# Patient Record
Sex: MALE | Race: ASIAN | Employment: FULL TIME | URBAN - METROPOLITAN AREA
[De-identification: names, ages, dates, MRNs, and addresses within clinical notes are randomized per-mention and may not be internally consistent; named-entity substitution may affect disease eponyms.]

---

## 2019-06-12 ENCOUNTER — HOSPITAL ENCOUNTER (INPATIENT)
Facility: HOSPITAL | Age: 39
LOS: 2 days | Discharge: HOME | DRG: 247 | End: 2019-06-14
Attending: EMERGENCY MEDICINE | Admitting: HOSPITALIST

## 2019-06-12 DIAGNOSIS — I21.4 NSTEMI (NON-ST ELEVATED MYOCARDIAL INFARCTION) (HCC): Primary | ICD-10-CM

## 2019-06-12 PROBLEM — R07.9 ACUTE CHEST PAIN: Status: ACTIVE | Noted: 2019-06-12

## 2019-06-12 PROCEDURE — 99223 1ST HOSP IP/OBS HIGH 75: CPT | Performed by: HOSPITALIST

## 2019-06-12 RX ORDER — CHLORHEXIDINE GLUCONATE 4 G/100ML
30 SOLUTION TOPICAL
Status: COMPLETED | OUTPATIENT
Start: 2019-06-13 | End: 2019-06-13

## 2019-06-12 RX ORDER — ACETAMINOPHEN 500 MG
1000 TABLET ORAL ONCE
Status: COMPLETED | OUTPATIENT
Start: 2019-06-12 | End: 2019-06-12

## 2019-06-12 RX ORDER — HEPARIN SODIUM AND DEXTROSE 10000; 5 [USP'U]/100ML; G/100ML
INJECTION INTRAVENOUS CONTINUOUS
Status: DISCONTINUED | OUTPATIENT
Start: 2019-06-13 | End: 2019-06-13

## 2019-06-12 RX ORDER — NITROGLYCERIN 0.4 MG/1
0.4 TABLET SUBLINGUAL ONCE
Status: COMPLETED | OUTPATIENT
Start: 2019-06-12 | End: 2019-06-12

## 2019-06-12 RX ORDER — ASPIRIN 325 MG
325 TABLET ORAL DAILY
Status: DISCONTINUED | OUTPATIENT
Start: 2019-06-13 | End: 2019-06-13

## 2019-06-12 RX ORDER — ONDANSETRON 2 MG/ML
4 INJECTION INTRAMUSCULAR; INTRAVENOUS EVERY 6 HOURS PRN
Status: DISCONTINUED | OUTPATIENT
Start: 2019-06-12 | End: 2019-06-14

## 2019-06-12 RX ORDER — ASPIRIN 81 MG/1
324 TABLET, CHEWABLE ORAL DAILY
Status: DISCONTINUED | OUTPATIENT
Start: 2019-06-13 | End: 2019-06-13

## 2019-06-12 RX ORDER — ASPIRIN 81 MG/1
324 TABLET, CHEWABLE ORAL ONCE
Status: COMPLETED | OUTPATIENT
Start: 2019-06-12 | End: 2019-06-12

## 2019-06-12 RX ORDER — HEPARIN SODIUM AND DEXTROSE 10000; 5 [USP'U]/100ML; G/100ML
1000 INJECTION INTRAVENOUS ONCE
Status: CANCELLED | OUTPATIENT
Start: 2019-06-12 | End: 2019-06-12

## 2019-06-12 RX ORDER — ATORVASTATIN CALCIUM 40 MG/1
80 TABLET, FILM COATED ORAL NIGHTLY
Status: DISCONTINUED | OUTPATIENT
Start: 2019-06-12 | End: 2019-06-14

## 2019-06-12 RX ORDER — MORPHINE SULFATE 2 MG/ML
1 INJECTION, SOLUTION INTRAMUSCULAR; INTRAVENOUS EVERY 2 HOUR PRN
Status: DISCONTINUED | OUTPATIENT
Start: 2019-06-12 | End: 2019-06-14

## 2019-06-12 RX ORDER — CARVEDILOL 6.25 MG/1
6.25 TABLET ORAL 2 TIMES DAILY WITH MEALS
Status: ON HOLD | COMMUNITY
End: 2019-06-14

## 2019-06-12 RX ORDER — NITROGLYCERIN 0.4 MG/1
0.4 TABLET SUBLINGUAL EVERY 5 MIN PRN
Status: DISCONTINUED | OUTPATIENT
Start: 2019-06-12 | End: 2019-06-14

## 2019-06-12 RX ORDER — CLOPIDOGREL BISULFATE 75 MG/1
75 TABLET ORAL ONCE
Status: COMPLETED | OUTPATIENT
Start: 2019-06-12 | End: 2019-06-12

## 2019-06-12 RX ORDER — HEPARIN SODIUM AND DEXTROSE 10000; 5 [USP'U]/100ML; G/100ML
1000 INJECTION INTRAVENOUS ONCE
Status: COMPLETED | OUTPATIENT
Start: 2019-06-12 | End: 2019-06-13

## 2019-06-12 RX ORDER — HEPARIN SODIUM AND DEXTROSE 10000; 5 [USP'U]/100ML; G/100ML
1000 INJECTION INTRAVENOUS ONCE
Status: DISCONTINUED | OUTPATIENT
Start: 2019-06-13 | End: 2019-06-12

## 2019-06-12 RX ORDER — CLOPIDOGREL BISULFATE 75 MG/1
75 TABLET ORAL DAILY
Status: ON HOLD | COMMUNITY
End: 2019-06-14

## 2019-06-12 RX ORDER — NITROGLYCERIN 20 MG/100ML
INJECTION INTRAVENOUS
Status: DISCONTINUED | OUTPATIENT
Start: 2019-06-12 | End: 2019-06-14

## 2019-06-12 RX ORDER — ACETAMINOPHEN 325 MG/1
650 TABLET ORAL EVERY 6 HOURS PRN
Status: DISCONTINUED | OUTPATIENT
Start: 2019-06-12 | End: 2019-06-14

## 2019-06-12 RX ORDER — LISINOPRIL 10 MG/1
10 TABLET ORAL DAILY
Status: ON HOLD | COMMUNITY
End: 2019-06-14

## 2019-06-12 RX ORDER — MORPHINE SULFATE 4 MG/ML
4 INJECTION, SOLUTION INTRAMUSCULAR; INTRAVENOUS EVERY 2 HOUR PRN
Status: DISCONTINUED | OUTPATIENT
Start: 2019-06-12 | End: 2019-06-14

## 2019-06-12 RX ORDER — HEPARIN SODIUM 5000 [USP'U]/ML
5000 INJECTION, SOLUTION INTRAVENOUS; SUBCUTANEOUS EVERY 12 HOURS SCHEDULED
Status: DISCONTINUED | OUTPATIENT
Start: 2019-06-12 | End: 2019-06-12

## 2019-06-12 RX ORDER — HEPARIN SODIUM 1000 [USP'U]/ML
5000 INJECTION, SOLUTION INTRAVENOUS; SUBCUTANEOUS ONCE
Status: COMPLETED | OUTPATIENT
Start: 2019-06-12 | End: 2019-06-12

## 2019-06-12 RX ORDER — SODIUM CHLORIDE 0.9 % (FLUSH) 0.9 %
3 SYRINGE (ML) INJECTION AS NEEDED
Status: DISCONTINUED | OUTPATIENT
Start: 2019-06-12 | End: 2019-06-14

## 2019-06-12 RX ORDER — MORPHINE SULFATE 2 MG/ML
2 INJECTION, SOLUTION INTRAMUSCULAR; INTRAVENOUS EVERY 2 HOUR PRN
Status: DISCONTINUED | OUTPATIENT
Start: 2019-06-12 | End: 2019-06-14

## 2019-06-12 RX ORDER — HEPARIN SODIUM AND DEXTROSE 10000; 5 [USP'U]/100ML; G/100ML
INJECTION INTRAVENOUS CONTINUOUS
Status: CANCELLED | OUTPATIENT
Start: 2019-06-13

## 2019-06-12 NOTE — CONSULTS
[facility]    Cardiology Consultation    Boyd Kip Patient Status:  Emergency    3/11/1980 MRN [id number]   Location [address] Attending Veronique Claudio MD   Hosp Day # 0 PCP None Pcp     2019  Katharine Scanlon 5-20 mcg/min, Intravenous, Titrated  •  Heparin Sodium (Porcine) 1000 UNIT/ML injection 5,000 Units, 5,000 Units, Intravenous, Once  •  heparin (PORCINE) 40202imfnx/250mL infusion ED INITIAL DOSE, 1,000 Units/hr, Intravenous, Once    Review of Systems:  GE be secondary to pulmonary disease consider old anterior infarct. ABNORMAL No previous ECGs available Electronically signed on 06/12/2019 at 17:22 by Eduardo Lee       Impression:  Patient Active Problem List:     Acute chest pain     NSTEMI (non-ST elev

## 2019-06-12 NOTE — H&P
[facility]    PATIENT'S NAME: Cl Guevara   ATTENDING PHYSICIAN: Kadi Gauthier MD   PATIENT ACCOUNT#:   [id number]    LOCATION:  [place] [address]  MEDICAL RECORD #:   [id number]       YOB: 1980  ADMISSION DATE:       06/12/20 place, and person. Moderate distress. VITAL SIGNS:  Temperature 98.0, pulse 81, respiratory rate 15, blood pressure 144/89, pulse ox 100% on room air. HEENT:  Atraumatic. Oropharynx clear. Moist mucous membranes. Normal hard and soft palate.   Eyes:

## 2019-06-12 NOTE — ED NOTES
Received pt a/ox3, clear speech, nad, no resp distress, ambulatory with steady gait  Here with c/o L side non radiating CP since [id number] today. States he was driving and ran to get out of the ran prior to pain.   Pt reports hx of cardiac stents, has been

## 2019-06-12 NOTE — ED PROVIDER NOTES
Patient Seen in: [facility] AND [facility] Emergency Department    History   Patient presents with:  Chest Pain Angina (cardiovascular)    Stated Complaint: L sided chest pain    HPI    20-year-old male with history of CAD status post cardiac stent 4 years ago, rhythm, normal heart sounds and intact distal pulses. Pulmonary/Chest: Effort normal and breath sounds normal. No respiratory distress. Abdominal: Soft. Bowel sounds are normal. There is no tenderness. Musculoskeletal: Normal range of motion.    Neuro intervals and axes as noted on EKG Report.   Rate: 79  Rhythm: Sinus Rhythm  Reading: Sinus rhythm, no ST elevation or depression              Imaging Results Available and Reviewed while in ED: No orders to display    ED Medications Administered:   Tram Tang Once      IP Consult to Cardiology Once      MD Discussions or Sign-Outs: Discussed with Dr. Ar Velasquez, recommends giving 75 mg Plavix. Seen in the emergency department by Dr. Frost Leader, recommend starting cardiac dose heparin, no other recommendations.   Discu 6/12/2019 Unknown

## 2019-06-12 NOTE — ED NOTES
Nitro drip started and meds given, see mar  Pt continues to c/o CP and RESENDEZ  Will continue to monitor  Pt and family updated on plan of care for admit

## 2019-06-13 ENCOUNTER — APPOINTMENT (OUTPATIENT)
Dept: INTERVENTIONAL RADIOLOGY/VASCULAR | Facility: HOSPITAL | Age: 39
DRG: 247 | End: 2019-06-13
Attending: INTERNAL MEDICINE

## 2019-06-13 ENCOUNTER — APPOINTMENT (OUTPATIENT)
Dept: CV DIAGNOSTICS | Facility: HOSPITAL | Age: 39
DRG: 247 | End: 2019-06-13
Attending: INTERNAL MEDICINE

## 2019-06-13 PROCEDURE — B2151ZZ FLUOROSCOPY OF LEFT HEART USING LOW OSMOLAR CONTRAST: ICD-10-PCS | Performed by: INTERNAL MEDICINE

## 2019-06-13 PROCEDURE — 93306 TTE W/DOPPLER COMPLETE: CPT | Performed by: INTERNAL MEDICINE

## 2019-06-13 PROCEDURE — B2111ZZ FLUOROSCOPY OF MULTIPLE CORONARY ARTERIES USING LOW OSMOLAR CONTRAST: ICD-10-PCS | Performed by: INTERNAL MEDICINE

## 2019-06-13 PROCEDURE — 4A023N7 MEASUREMENT OF CARDIAC SAMPLING AND PRESSURE, LEFT HEART, PERCUTANEOUS APPROACH: ICD-10-PCS | Performed by: INTERNAL MEDICINE

## 2019-06-13 PROCEDURE — 99232 SBSQ HOSP IP/OBS MODERATE 35: CPT | Performed by: HOSPITALIST

## 2019-06-13 PROCEDURE — 027236Z DILATION OF CORONARY ARTERY, THREE ARTERIES WITH THREE DRUG-ELUTING INTRALUMINAL DEVICES, PERCUTANEOUS APPROACH: ICD-10-PCS | Performed by: INTERNAL MEDICINE

## 2019-06-13 RX ORDER — SPIRONOLACTONE 25 MG/1
25 TABLET ORAL DAILY
Status: DISCONTINUED | OUTPATIENT
Start: 2019-06-13 | End: 2019-06-14

## 2019-06-13 RX ORDER — HEPARIN SODIUM 1000 [USP'U]/ML
INJECTION, SOLUTION INTRAVENOUS; SUBCUTANEOUS
Status: COMPLETED
Start: 2019-06-13 | End: 2019-06-13

## 2019-06-13 RX ORDER — HYDRALAZINE HYDROCHLORIDE 20 MG/ML
10 INJECTION INTRAMUSCULAR; INTRAVENOUS EVERY 6 HOURS PRN
Status: DISCONTINUED | OUTPATIENT
Start: 2019-06-13 | End: 2019-06-14

## 2019-06-13 RX ORDER — MIDAZOLAM HYDROCHLORIDE 1 MG/ML
INJECTION INTRAMUSCULAR; INTRAVENOUS
Status: COMPLETED
Start: 2019-06-13 | End: 2019-06-13

## 2019-06-13 RX ORDER — ASPIRIN 81 MG/1
81 TABLET ORAL DAILY
Status: DISCONTINUED | OUTPATIENT
Start: 2019-06-13 | End: 2019-06-13

## 2019-06-13 RX ORDER — ASPIRIN 81 MG/1
81 TABLET ORAL DAILY
Status: DISCONTINUED | OUTPATIENT
Start: 2019-06-13 | End: 2019-06-14

## 2019-06-13 RX ORDER — METOPROLOL TARTRATE 50 MG/1
50 TABLET, FILM COATED ORAL
Status: DISCONTINUED | OUTPATIENT
Start: 2019-06-13 | End: 2019-06-14

## 2019-06-13 RX ORDER — SODIUM CHLORIDE 9 MG/ML
INJECTION, SOLUTION INTRAVENOUS CONTINUOUS
Status: ACTIVE | OUTPATIENT
Start: 2019-06-13 | End: 2019-06-13

## 2019-06-13 RX ORDER — SODIUM CHLORIDE 9 MG/ML
INJECTION, SOLUTION INTRAVENOUS
Status: COMPLETED
Start: 2019-06-13 | End: 2019-06-13

## 2019-06-13 RX ORDER — LIDOCAINE HYDROCHLORIDE 20 MG/ML
INJECTION, SOLUTION EPIDURAL; INFILTRATION; INTRACAUDAL; PERINEURAL
Status: COMPLETED
Start: 2019-06-13 | End: 2019-06-13

## 2019-06-13 RX ORDER — ENALAPRIL MALEATE 5 MG/1
5 TABLET ORAL 2 TIMES DAILY
Status: DISCONTINUED | OUTPATIENT
Start: 2019-06-13 | End: 2019-06-14

## 2019-06-13 NOTE — PLAN OF CARE
Problem: Patient/Family Goals  Goal: Patient/Family Long Term Goal  Description  Patient's Long Term Goal: go home    Interventions:  - Angiogram scheduled for tomorrow morning  - heparin and nitro drip infusing  - See additional Care Plan goals for spec patient/family  - Incorporate patient and family knowledge, values, beliefs, and cultural backgrounds into the planning and delivery of care  - Encourage patient/family to participate in care and decision-making at the level they choose  - Honor patient an

## 2019-06-13 NOTE — PROGRESS NOTES
[facility]    Progress Note    Amy Melchor Patient Status:  Inpatient    3/11/1980 MRN [id number]   Location [facility] 2W/SW Attending Eliceo Hagen MD   Hosp Day # 1 PCP None Pcp       Subjective:   Amy Melchor is a( Intravenous, Q6H PRN  •  morphINE sulfate (PF) 2 MG/ML injection 1 mg, 1 mg, Intravenous, Q2H PRN **OR** morphINE sulfate (PF) 2 MG/ML injection 2 mg, 2 mg, Intravenous, Q2H PRN **OR** morphINE sulfate (PF) 4 MG/ML injection 4 mg, 4 mg, Intravenous, Q2H [place]

## 2019-06-13 NOTE — PROCEDURES
[facility] - [facility]    [facility] Cardiac Cath Procedure Note  Rakan Cohn Patient Status:  Inpatient    3/11/1980 MRN [id number]   Location [facility] 2W/SW Attending Jessica Pathak MD   Hosp Day # 1 PCP None Pcp       Cardiologist: stent 3.0 x 18 mm RICARDO DAWIT @ 20 MADDY    Excellent angiographic result      Assessment:  NSTEMI secondary to thrombosed OM1  CAD s/p PCI as above  Reduced EF, ischemic    Recommendations:  Check echo  Complete cangrelor bag, on completion bolus ticagrelor

## 2019-06-14 VITALS
DIASTOLIC BLOOD PRESSURE: 74 MMHG | OXYGEN SATURATION: 98 % | HEIGHT: 66 IN | WEIGHT: 190.81 LBS | SYSTOLIC BLOOD PRESSURE: 111 MMHG | TEMPERATURE: 98 F | HEART RATE: 104 BPM | BODY MASS INDEX: 30.67 KG/M2 | RESPIRATION RATE: 18 BRPM

## 2019-06-14 PROCEDURE — 99239 HOSP IP/OBS DSCHRG MGMT >30: CPT | Performed by: HOSPITALIST

## 2019-06-14 RX ORDER — SPIRONOLACTONE 25 MG/1
25 TABLET ORAL DAILY
Qty: 30 TABLET | Refills: 0 | Status: SHIPPED | OUTPATIENT
Start: 2019-06-14 | End: 2019-06-14

## 2019-06-14 RX ORDER — POTASSIUM CHLORIDE 20 MEQ/1
40 TABLET, EXTENDED RELEASE ORAL ONCE
Status: COMPLETED | OUTPATIENT
Start: 2019-06-14 | End: 2019-06-14

## 2019-06-14 RX ORDER — CLOPIDOGREL BISULFATE 75 MG/1
600 TABLET ORAL ONCE
Status: COMPLETED | OUTPATIENT
Start: 2019-06-14 | End: 2019-06-14

## 2019-06-14 RX ORDER — ASPIRIN 81 MG/1
81 TABLET ORAL DAILY
Qty: 30 TABLET | Refills: 0 | Status: SHIPPED | OUTPATIENT
Start: 2019-06-14 | End: 2021-09-13

## 2019-06-14 RX ORDER — SPIRONOLACTONE 25 MG/1
25 TABLET ORAL DAILY
Qty: 30 TABLET | Refills: 2 | Status: SHIPPED | OUTPATIENT
Start: 2019-06-14 | End: 2019-10-28

## 2019-06-14 RX ORDER — ATORVASTATIN CALCIUM 80 MG/1
80 TABLET, FILM COATED ORAL NIGHTLY
Qty: 30 TABLET | Refills: 2 | Status: SHIPPED | OUTPATIENT
Start: 2019-06-14 | End: 2019-10-28

## 2019-06-14 RX ORDER — ENALAPRIL MALEATE 5 MG/1
5 TABLET ORAL 2 TIMES DAILY
Qty: 60 TABLET | Refills: 0 | Status: SHIPPED | OUTPATIENT
Start: 2019-06-14 | End: 2019-06-14

## 2019-06-14 RX ORDER — CLOPIDOGREL BISULFATE 75 MG/1
75 TABLET ORAL DAILY
Status: DISCONTINUED | OUTPATIENT
Start: 2019-06-15 | End: 2019-06-14

## 2019-06-14 RX ORDER — LISINOPRIL 5 MG/1
5 TABLET ORAL DAILY
Qty: 30 TABLET | Refills: 2 | Status: SHIPPED | OUTPATIENT
Start: 2019-06-14 | End: 2019-10-28

## 2019-06-14 RX ORDER — CLOPIDOGREL BISULFATE 75 MG/1
75 TABLET ORAL DAILY
Qty: 30 TABLET | Refills: 11 | Status: SHIPPED | OUTPATIENT
Start: 2019-06-14 | End: 2019-10-28

## 2019-06-14 RX ORDER — METOPROLOL TARTRATE 50 MG/1
50 TABLET, FILM COATED ORAL
Qty: 60 TABLET | Refills: 2 | Status: SHIPPED | OUTPATIENT
Start: 2019-06-14 | End: 2019-10-28

## 2019-06-14 RX ORDER — ATORVASTATIN CALCIUM 80 MG/1
80 TABLET, FILM COATED ORAL NIGHTLY
Qty: 30 TABLET | Refills: 0 | Status: SHIPPED | OUTPATIENT
Start: 2019-06-14 | End: 2019-06-14

## 2019-06-14 RX ORDER — METOPROLOL TARTRATE 50 MG/1
50 TABLET, FILM COATED ORAL
Qty: 60 TABLET | Refills: 0 | Status: SHIPPED | OUTPATIENT
Start: 2019-06-14 | End: 2019-06-14

## 2019-06-14 NOTE — DISCHARGE SUMMARY
[facility]    Discharge Summary    Tyler Sifuentes Patient Status:  Inpatient    3/11/1980 MRN [id number]   Location [facility] 3W/SW Attending Cody Abraham MD   Hosp Day # 2 PCP None Pcp     Date of Admission: 2019 D were unremarkable. His triglycerides were 1200. LDL was too lipemic to be measured. Troponin 0.058. White blood cell count 11.6. Patient was started on IV nitroglycerin. Based on Cardiology's recommendations, he was given aspirin and Plavix.   He will Quantity:  30 tablet  Refills:  2        CONTINUE taking these medications      Instructions Prescription details   Clopidogrel Bisulfate 75 MG Tabs  Commonly known as:  PLAVIX      Take 1 tablet (75 mg total) by mouth daily.    Quantity:  30 tablet  Refill

## 2019-06-14 NOTE — CM/SW NOTE
SW self-referred to meet w/ pt due to finance needs and being listed as uninsured. Pt is from [place] and stated he is uncertain if he will live in [place] or [place].  SW recommended pt to apply for medicaid online and then follow up w/ nearest public aide office i

## 2019-06-14 NOTE — CARDIAC REHAB
Cardiac Rehab Phase I    Activity:  Distance Per self  Assistance needed No  Patient tolerated activity Well per patient. Education:  Handouts provided and reviewed: [address] Diet: Healthy Cardiac diet reviewed.     Disease Pr

## 2019-06-14 NOTE — PLAN OF CARE
Otilia Washington is feeling well, anticipating discharge. Seen by dietitian and cardiac rehab. Follow up appointment made for wound check.     Problem: Patient/Family Goals  Goal: Patient/Family Long Term Goal  Description  Patient's Long Term Goal: go home    [facility] as my wife says that but I have never been diagnosed with it\"  - Provide timely, complete, and accurate information to patient/family  - Incorporate patient and family knowledge, values, beliefs, and cultural backgrounds into the planning and delivery of

## 2019-06-14 NOTE — PLAN OF CARE
Nanette Nevarez is being discharged with plan for follow up for cardiac rehab. Wound check appointment for post cath groin site declined per patient at this time. Patient agrees to call to schedule when he gets home.     Problem: Patient/Family Goals  Goal: Patient control medications as ordered  - Initiate emergency measures for life threatening arrhythmias  - Monitor electrolytes and administer replacement therapy as ordered  6/14/2019 1101 by Anthony Snider RN  Outcome: Adequate for Discharge  6/14/2019 1054 b

## 2019-06-14 NOTE — DIETARY NOTE
NUTRITION EDUCATION NOTE    Received consult for nutrition education. Appropriate education and handout provided. See education section of Epic for specifics.     Judie Rice RD,LDN  [phone]

## 2019-06-18 ENCOUNTER — CARDPULM VISIT (OUTPATIENT)
Dept: CARDIAC REHAB | Facility: HOSPITAL | Age: 39
End: 2019-06-18
Attending: INTERNAL MEDICINE

## 2019-10-28 ENCOUNTER — OFFICE VISIT (OUTPATIENT)
Dept: INTERNAL MEDICINE CLINIC | Facility: CLINIC | Age: 39
End: 2019-10-28
Payer: MEDICAID

## 2019-10-28 VITALS
WEIGHT: 193 LBS | OXYGEN SATURATION: 96 % | DIASTOLIC BLOOD PRESSURE: 78 MMHG | HEART RATE: 89 BPM | HEIGHT: 66 IN | BODY MASS INDEX: 31.02 KG/M2 | SYSTOLIC BLOOD PRESSURE: 119 MMHG

## 2019-10-28 DIAGNOSIS — Z12.5 SCREENING PSA (PROSTATE SPECIFIC ANTIGEN): ICD-10-CM

## 2019-10-28 DIAGNOSIS — E78.5 DYSLIPIDEMIA: ICD-10-CM

## 2019-10-28 DIAGNOSIS — I25.10 CORONARY ARTERY DISEASE INVOLVING NATIVE CORONARY ARTERY OF NATIVE HEART WITHOUT ANGINA PECTORIS: ICD-10-CM

## 2019-10-28 DIAGNOSIS — I10 ESSENTIAL HYPERTENSION: Primary | ICD-10-CM

## 2019-10-28 DIAGNOSIS — Z23 NEED FOR INFLUENZA VACCINATION: ICD-10-CM

## 2019-10-28 PROBLEM — R07.9 ACUTE CHEST PAIN: Status: RESOLVED | Noted: 2019-06-12 | Resolved: 2019-10-28

## 2019-10-28 PROCEDURE — 36415 COLL VENOUS BLD VENIPUNCTURE: CPT | Performed by: INTERNAL MEDICINE

## 2019-10-28 PROCEDURE — 90686 IIV4 VACC NO PRSV 0.5 ML IM: CPT | Performed by: INTERNAL MEDICINE

## 2019-10-28 PROCEDURE — 90471 IMMUNIZATION ADMIN: CPT | Performed by: INTERNAL MEDICINE

## 2019-10-28 PROCEDURE — 99214 OFFICE O/P EST MOD 30 MIN: CPT | Performed by: INTERNAL MEDICINE

## 2019-10-28 RX ORDER — SPIRONOLACTONE 25 MG/1
25 TABLET ORAL DAILY
Qty: 30 TABLET | Refills: 2 | Status: SHIPPED | OUTPATIENT
Start: 2019-10-28 | End: 2020-02-24

## 2019-10-28 RX ORDER — LISINOPRIL 5 MG/1
5 TABLET ORAL DAILY
Qty: 30 TABLET | Refills: 2 | Status: SHIPPED | OUTPATIENT
Start: 2019-10-28 | End: 2020-02-24

## 2019-10-28 RX ORDER — ATORVASTATIN CALCIUM 80 MG/1
80 TABLET, FILM COATED ORAL NIGHTLY
Qty: 30 TABLET | Refills: 2 | Status: SHIPPED | OUTPATIENT
Start: 2019-10-28 | End: 2020-02-24

## 2019-10-28 RX ORDER — CLOPIDOGREL BISULFATE 75 MG/1
75 TABLET ORAL DAILY
Qty: 30 TABLET | Refills: 11 | Status: SHIPPED | OUTPATIENT
Start: 2019-10-28 | End: 2021-01-09

## 2019-10-28 RX ORDER — METOPROLOL TARTRATE 50 MG/1
50 TABLET, FILM COATED ORAL
Qty: 60 TABLET | Refills: 2 | Status: SHIPPED | OUTPATIENT
Start: 2019-10-28 | End: 2020-02-24

## 2019-10-28 NOTE — PROGRESS NOTES
James Worthy is a 37year old male. Patient presents with:  [facility] Care    HPI:   43-year-old gentleman with a past medical history of coronary disease status post PCI, had thrombosis with repeat PCI, hypertension, dyslipidemia with a highly elevated t Disp: 30 tablet, Rfl: 0       Past Medical History:   Diagnosis Date   • Acute chest pain 6/12/2019   • Essential hypertension    • Heart disease    • Myocardial infarction [facility])       Past Surgical History:   Procedure Laterality Date   • OTHER SURGICAL H He is oriented to person, place, and time. He appears well-developed and well-nourished. HENT:   Head: Normocephalic. Eyes: Conjunctivae are normal.   Neck: Neck supple. No thyromegaly present.    Cardiovascular: Normal rate, regular rhythm and normal h today.  I encouraged patient to see his cardiologist.  - HEMOGLOBIN A1C; Future  - CARDIO - INTERNAL    4. Screening PSA (prostate specific antigen)    - PSA SCREEN; Future    5.  Need for influenza vaccination    - FLULAVAL INFLUENZA VACCINE QUAD PRESERVAT

## 2019-11-14 NOTE — PLAN OF CARE
Problem: Patient/Family Goals  Goal: Patient/Family Long Term Goal  Description  Patient's Long Term Goal: go home    Interventions:  - Angiogram scheduled for tomorrow morning  - heparin and nitro drip infusing  - See additional Care Plan goals for spec patient/family  - Incorporate patient and family knowledge, values, beliefs, and cultural backgrounds into the planning and delivery of care  - Encourage patient/family to participate in care and decision-making at the level they choose  - Honor patient an Statement Selected

## 2020-02-24 RX ORDER — LISINOPRIL 5 MG/1
TABLET ORAL
Qty: 30 TABLET | Refills: 1 | Status: SHIPPED | OUTPATIENT
Start: 2020-02-24 | End: 2020-05-15

## 2020-02-24 RX ORDER — ATORVASTATIN CALCIUM 80 MG/1
TABLET, FILM COATED ORAL
Qty: 30 TABLET | Refills: 1 | Status: SHIPPED | OUTPATIENT
Start: 2020-02-24 | End: 2020-05-15

## 2020-02-24 RX ORDER — METOPROLOL TARTRATE 50 MG/1
TABLET, FILM COATED ORAL
Qty: 60 TABLET | Refills: 1 | Status: SHIPPED | OUTPATIENT
Start: 2020-02-24 | End: 2020-05-15

## 2020-02-24 RX ORDER — SPIRONOLACTONE 25 MG/1
TABLET ORAL
Qty: 30 TABLET | Refills: 1 | Status: SHIPPED | OUTPATIENT
Start: 2020-02-24 | End: 2020-05-15

## 2020-02-24 NOTE — TELEPHONE ENCOUNTER
Please advise on refill requests. Patient was to have repeat A1c, CBC, lipid panel and follow up in office with you in 3 months.      No labs ordered

## 2020-05-15 RX ORDER — METOPROLOL TARTRATE 50 MG/1
TABLET, FILM COATED ORAL
Qty: 60 TABLET | Refills: 0 | Status: SHIPPED | OUTPATIENT
Start: 2020-05-15 | End: 2020-06-30

## 2020-05-15 RX ORDER — SPIRONOLACTONE 25 MG/1
TABLET ORAL
Qty: 30 TABLET | Refills: 0 | Status: SHIPPED | OUTPATIENT
Start: 2020-05-15 | End: 2020-06-30

## 2020-05-15 RX ORDER — LISINOPRIL 5 MG/1
TABLET ORAL
Qty: 30 TABLET | Refills: 0 | Status: SHIPPED | OUTPATIENT
Start: 2020-05-15 | End: 2020-06-30

## 2020-05-15 RX ORDER — ATORVASTATIN CALCIUM 80 MG/1
TABLET, FILM COATED ORAL
Qty: 30 TABLET | Refills: 0 | Status: SHIPPED | OUTPATIENT
Start: 2020-05-15 | End: 2020-06-30

## 2020-05-18 ENCOUNTER — VIRTUAL PHONE E/M (OUTPATIENT)
Dept: INTERNAL MEDICINE CLINIC | Facility: CLINIC | Age: 40
End: 2020-05-18
Payer: MEDICAID

## 2020-05-18 DIAGNOSIS — I10 ESSENTIAL HYPERTENSION: Primary | ICD-10-CM

## 2020-05-18 DIAGNOSIS — R73.03 PREDIABETES: ICD-10-CM

## 2020-05-18 PROCEDURE — 99213 OFFICE O/P EST LOW 20 MIN: CPT | Performed by: INTERNAL MEDICINE

## 2020-05-18 NOTE — PROGRESS NOTES
Swetha Boston is a 41year old male. No chief complaint on file. Virtual/Telephone Check-In    Swetha Boston verbally consents to a Virtual/Telephone Check-In service on 05/18/20.   Patient understands and accepts financial responsibility for any deductib REVIEW OF SYSTEMS:     Review of Systems   Constitutional: Negative for activity change, appetite change and fever. HENT: Negative for congestion and voice change. Respiratory: Negative for cough and shortness of breath.     Cardiovascular: Negativ

## 2020-06-30 RX ORDER — METOPROLOL TARTRATE 50 MG/1
TABLET, FILM COATED ORAL
Qty: 60 TABLET | Refills: 0 | Status: SHIPPED | OUTPATIENT
Start: 2020-06-30 | End: 2020-07-27

## 2020-06-30 RX ORDER — SPIRONOLACTONE 25 MG/1
TABLET ORAL
Qty: 30 TABLET | Refills: 0 | Status: SHIPPED | OUTPATIENT
Start: 2020-06-30 | End: 2020-07-27

## 2020-06-30 RX ORDER — LISINOPRIL 5 MG/1
TABLET ORAL
Qty: 30 TABLET | Refills: 0 | Status: SHIPPED | OUTPATIENT
Start: 2020-06-30 | End: 2020-07-27

## 2020-06-30 RX ORDER — ATORVASTATIN CALCIUM 80 MG/1
TABLET, FILM COATED ORAL
Qty: 30 TABLET | Refills: 0 | Status: SHIPPED | OUTPATIENT
Start: 2020-06-30 | End: 2020-07-27

## 2020-07-27 RX ORDER — ATORVASTATIN CALCIUM 80 MG/1
TABLET, FILM COATED ORAL
Qty: 30 TABLET | Refills: 0 | Status: SHIPPED
Start: 2020-07-27 | End: 2020-08-31

## 2020-07-27 RX ORDER — SPIRONOLACTONE 25 MG/1
TABLET ORAL
Qty: 30 TABLET | Refills: 0 | Status: SHIPPED | OUTPATIENT
Start: 2020-07-27 | End: 2020-08-31

## 2020-07-27 RX ORDER — LISINOPRIL 5 MG/1
TABLET ORAL
Qty: 30 TABLET | Refills: 0 | Status: SHIPPED | OUTPATIENT
Start: 2020-07-27 | End: 2020-07-30

## 2020-07-27 RX ORDER — METOPROLOL TARTRATE 50 MG/1
TABLET, FILM COATED ORAL
Qty: 60 TABLET | Refills: 0 | Status: SHIPPED | OUTPATIENT
Start: 2020-07-27 | End: 2020-08-31

## 2020-07-30 RX ORDER — LISINOPRIL 5 MG/1
TABLET ORAL
Qty: 30 TABLET | Refills: 0 | Status: SHIPPED | OUTPATIENT
Start: 2020-07-30 | End: 2020-08-31

## 2020-08-31 RX ORDER — ATORVASTATIN CALCIUM 80 MG/1
TABLET, FILM COATED ORAL
Qty: 30 TABLET | Refills: 0 | Status: SHIPPED | OUTPATIENT
Start: 2020-08-31 | End: 2020-09-27

## 2020-08-31 RX ORDER — LISINOPRIL 5 MG/1
TABLET ORAL
Qty: 30 TABLET | Refills: 0 | Status: SHIPPED | OUTPATIENT
Start: 2020-08-31 | End: 2020-09-27

## 2020-08-31 RX ORDER — METOPROLOL TARTRATE 50 MG/1
TABLET, FILM COATED ORAL
Qty: 60 TABLET | Refills: 0 | Status: SHIPPED | OUTPATIENT
Start: 2020-08-31 | End: 2020-09-27

## 2020-08-31 RX ORDER — SPIRONOLACTONE 25 MG/1
25 TABLET ORAL DAILY
Qty: 30 TABLET | Refills: 0 | Status: SHIPPED | OUTPATIENT
Start: 2020-08-31 | End: 2020-09-27

## 2020-09-27 RX ORDER — SPIRONOLACTONE 25 MG/1
TABLET ORAL
Qty: 30 TABLET | Refills: 0 | Status: SHIPPED | OUTPATIENT
Start: 2020-09-27 | End: 2020-11-02

## 2020-09-27 RX ORDER — METOPROLOL TARTRATE 50 MG/1
TABLET, FILM COATED ORAL
Qty: 60 TABLET | Refills: 0 | Status: SHIPPED | OUTPATIENT
Start: 2020-09-27 | End: 2020-11-02

## 2020-09-27 RX ORDER — LISINOPRIL 5 MG/1
TABLET ORAL
Qty: 30 TABLET | Refills: 0 | Status: SHIPPED | OUTPATIENT
Start: 2020-09-27 | End: 2020-11-02

## 2020-09-27 RX ORDER — ATORVASTATIN CALCIUM 80 MG/1
TABLET, FILM COATED ORAL
Qty: 30 TABLET | Refills: 0 | Status: SHIPPED | OUTPATIENT
Start: 2020-09-27 | End: 2020-11-02

## 2020-11-02 RX ORDER — METOPROLOL TARTRATE 50 MG/1
TABLET, FILM COATED ORAL
Qty: 60 TABLET | Refills: 0 | Status: SHIPPED | OUTPATIENT
Start: 2020-11-02 | End: 2021-01-09

## 2020-11-02 RX ORDER — SPIRONOLACTONE 25 MG/1
TABLET ORAL
Qty: 30 TABLET | Refills: 0 | Status: SHIPPED | OUTPATIENT
Start: 2020-11-02 | End: 2021-01-09

## 2020-11-02 RX ORDER — LISINOPRIL 5 MG/1
TABLET ORAL
Qty: 30 TABLET | Refills: 0 | Status: SHIPPED | OUTPATIENT
Start: 2020-11-02 | End: 2021-01-09

## 2020-11-02 RX ORDER — ATORVASTATIN CALCIUM 80 MG/1
TABLET, FILM COATED ORAL
Qty: 30 TABLET | Refills: 0 | Status: SHIPPED | OUTPATIENT
Start: 2020-11-02 | End: 2021-01-09

## 2020-11-03 NOTE — TELEPHONE ENCOUNTER
I have approved his medication. He is due for his physical and blood testing. Please inform patient and facilitate an appointment.   Thank you

## 2021-01-11 RX ORDER — LISINOPRIL 5 MG/1
5 TABLET ORAL DAILY
Qty: 30 TABLET | Refills: 0 | Status: SHIPPED | OUTPATIENT
Start: 2021-01-11 | End: 2021-03-08

## 2021-01-11 RX ORDER — SPIRONOLACTONE 25 MG/1
25 TABLET ORAL DAILY
Qty: 30 TABLET | Refills: 0 | Status: SHIPPED | OUTPATIENT
Start: 2021-01-11 | End: 2021-03-08

## 2021-01-11 RX ORDER — METOPROLOL TARTRATE 50 MG/1
50 TABLET, FILM COATED ORAL 2 TIMES DAILY
Qty: 60 TABLET | Refills: 0 | Status: SHIPPED | OUTPATIENT
Start: 2021-01-11 | End: 2021-03-08

## 2021-01-11 RX ORDER — ATORVASTATIN CALCIUM 80 MG/1
80 TABLET, FILM COATED ORAL NIGHTLY
Qty: 30 TABLET | Refills: 0 | Status: SHIPPED | OUTPATIENT
Start: 2021-01-11 | End: 2021-03-08

## 2021-01-11 RX ORDER — CLOPIDOGREL BISULFATE 75 MG/1
75 TABLET ORAL DAILY
Qty: 30 TABLET | Refills: 11 | Status: SHIPPED | OUTPATIENT
Start: 2021-01-11 | End: 2021-03-08

## 2021-02-10 RX ORDER — METOPROLOL TARTRATE 50 MG/1
TABLET, FILM COATED ORAL
Qty: 60 TABLET | Refills: 0 | OUTPATIENT
Start: 2021-02-10

## 2021-02-10 RX ORDER — SPIRONOLACTONE 25 MG/1
TABLET ORAL
Qty: 30 TABLET | Refills: 0 | OUTPATIENT
Start: 2021-02-10

## 2021-02-10 RX ORDER — LISINOPRIL 5 MG/1
TABLET ORAL
Qty: 30 TABLET | Refills: 0 | OUTPATIENT
Start: 2021-02-10

## 2021-02-10 RX ORDER — CLOPIDOGREL BISULFATE 75 MG/1
TABLET ORAL
Qty: 30 TABLET | Refills: 10 | OUTPATIENT
Start: 2021-02-10

## 2021-02-10 RX ORDER — ATORVASTATIN CALCIUM 80 MG/1
TABLET, FILM COATED ORAL
Qty: 30 TABLET | Refills: 0 | OUTPATIENT
Start: 2021-02-10

## 2021-02-10 NOTE — TELEPHONE ENCOUNTER
Last seen in office in 2019. He needs an appointment for physical and for blood works. It is not safe to take medications without monitoring blood works.     If he is following up with another MD, he needs to schedule appointment and need to notify his ph

## 2021-03-03 ENCOUNTER — LAB ENCOUNTER (OUTPATIENT)
Dept: LAB | Facility: HOSPITAL | Age: 41
End: 2021-03-03
Attending: INTERNAL MEDICINE
Payer: MEDICAID

## 2021-03-03 DIAGNOSIS — I10 ESSENTIAL HYPERTENSION: ICD-10-CM

## 2021-03-03 DIAGNOSIS — R73.03 PREDIABETES: ICD-10-CM

## 2021-03-03 LAB
CHOLEST SMN-MCNC: 215 MG/DL (ref ?–200)
DEPRECATED RDW RBC AUTO: 36.8 FL (ref 35.1–46.3)
ERYTHROCYTE [DISTWIDTH] IN BLOOD BY AUTOMATED COUNT: 11.5 % (ref 11–15)
EST. AVERAGE GLUCOSE BLD GHB EST-MCNC: 123 MG/DL (ref 68–126)
HBA1C MFR BLD HPLC: 5.9 % (ref ?–5.7)
HCT VFR BLD AUTO: 44.4 %
HDLC SERPL-MCNC: 48 MG/DL (ref 40–59)
HGB BLD-MCNC: 16.1 G/DL
LDLC SERPL CALC-MCNC: 96 MG/DL (ref ?–100)
MCH RBC QN AUTO: 31.9 PG (ref 26–34)
MCHC RBC AUTO-ENTMCNC: 36.3 G/DL (ref 31–37)
MCV RBC AUTO: 88.1 FL
NONHDLC SERPL-MCNC: 167 MG/DL (ref ?–130)
PATIENT FASTING Y/N/NP: YES
PLATELET # BLD AUTO: 217 10(3)UL (ref 150–450)
RBC # BLD AUTO: 5.04 X10(6)UL
TRIGL SERPL-MCNC: 353 MG/DL (ref 30–149)
VLDLC SERPL CALC-MCNC: 71 MG/DL (ref 0–30)
WBC # BLD AUTO: 5.8 X10(3) UL (ref 4–11)

## 2021-03-03 PROCEDURE — 83036 HEMOGLOBIN GLYCOSYLATED A1C: CPT

## 2021-03-03 PROCEDURE — 36415 COLL VENOUS BLD VENIPUNCTURE: CPT

## 2021-03-03 PROCEDURE — 85027 COMPLETE CBC AUTOMATED: CPT

## 2021-03-03 PROCEDURE — 80061 LIPID PANEL: CPT

## 2021-03-08 ENCOUNTER — OFFICE VISIT (OUTPATIENT)
Dept: INTERNAL MEDICINE CLINIC | Facility: CLINIC | Age: 41
End: 2021-03-08
Payer: MEDICAID

## 2021-03-08 VITALS
HEART RATE: 64 BPM | DIASTOLIC BLOOD PRESSURE: 78 MMHG | BODY MASS INDEX: 29.89 KG/M2 | HEIGHT: 66 IN | OXYGEN SATURATION: 98 % | SYSTOLIC BLOOD PRESSURE: 110 MMHG | WEIGHT: 186 LBS | RESPIRATION RATE: 14 BRPM

## 2021-03-08 DIAGNOSIS — I25.10 CORONARY ARTERY DISEASE INVOLVING NATIVE CORONARY ARTERY OF NATIVE HEART WITHOUT ANGINA PECTORIS: ICD-10-CM

## 2021-03-08 DIAGNOSIS — Z00.00 ADULT GENERAL MEDICAL EXAM: Primary | ICD-10-CM

## 2021-03-08 PROCEDURE — 99396 PREV VISIT EST AGE 40-64: CPT | Performed by: INTERNAL MEDICINE

## 2021-03-08 PROCEDURE — 3078F DIAST BP <80 MM HG: CPT | Performed by: INTERNAL MEDICINE

## 2021-03-08 PROCEDURE — 3008F BODY MASS INDEX DOCD: CPT | Performed by: INTERNAL MEDICINE

## 2021-03-08 PROCEDURE — 3074F SYST BP LT 130 MM HG: CPT | Performed by: INTERNAL MEDICINE

## 2021-03-08 RX ORDER — METOPROLOL TARTRATE 50 MG/1
50 TABLET, FILM COATED ORAL 2 TIMES DAILY
Qty: 180 TABLET | Refills: 1 | Status: SHIPPED | OUTPATIENT
Start: 2021-03-08 | End: 2021-09-03

## 2021-03-08 RX ORDER — LISINOPRIL 5 MG/1
5 TABLET ORAL DAILY
Qty: 90 TABLET | Refills: 1 | Status: SHIPPED | OUTPATIENT
Start: 2021-03-08 | End: 2021-09-03

## 2021-03-08 RX ORDER — CLOPIDOGREL BISULFATE 75 MG/1
75 TABLET ORAL DAILY
Qty: 90 TABLET | Refills: 1 | Status: SHIPPED | OUTPATIENT
Start: 2021-03-08 | End: 2021-09-03

## 2021-03-08 RX ORDER — SPIRONOLACTONE 25 MG/1
25 TABLET ORAL DAILY
Qty: 30 TABLET | Refills: 0 | Status: CANCELLED | OUTPATIENT
Start: 2021-03-08

## 2021-03-08 RX ORDER — ATORVASTATIN CALCIUM 80 MG/1
80 TABLET, FILM COATED ORAL NIGHTLY
Qty: 90 TABLET | Refills: 1 | Status: SHIPPED | OUTPATIENT
Start: 2021-03-08 | End: 2021-09-03

## 2021-03-08 RX ORDER — SPIRONOLACTONE 25 MG/1
25 TABLET ORAL DAILY
Qty: 90 TABLET | Refills: 1 | Status: SHIPPED | OUTPATIENT
Start: 2021-03-08 | End: 2021-09-03

## 2021-03-08 NOTE — PROGRESS NOTES
Marlen Molina is a 41year old male. Patient presents with:  Physical    HPI:   43-year-old gentleman with a past medical history of coronary disease status post PCI, hypertension, dyslipidemia, hypertriglyceridemia, mild ischemic dilated cardiomyopathy he Alcohol use: Yes    Drug use: Never     Family History   Problem Relation Age of Onset   • Diabetes Mother       No Known Allergies     REVIEW OF SYSTEMS:     Review of Systems   Constitutional: Negative for appetite change, fever and unexpected weight fred normal. No respiratory distress. Breath sounds: Normal breath sounds. No wheezing or rales. Abdominal:      General: Bowel sounds are normal.      Palpations: Abdomen is soft. Tenderness: There is no abdominal tenderness.    Musculoskeletal:

## 2021-09-03 RX ORDER — LISINOPRIL 5 MG/1
TABLET ORAL
Qty: 90 TABLET | Refills: 1 | Status: SHIPPED | OUTPATIENT
Start: 2021-09-03 | End: 2021-09-13

## 2021-09-03 RX ORDER — ATORVASTATIN CALCIUM 80 MG/1
TABLET, FILM COATED ORAL
Qty: 90 TABLET | Refills: 1 | Status: SHIPPED | OUTPATIENT
Start: 2021-09-03 | End: 2021-09-13

## 2021-09-03 RX ORDER — CLOPIDOGREL BISULFATE 75 MG/1
TABLET ORAL
Qty: 90 TABLET | Refills: 1 | Status: SHIPPED | OUTPATIENT
Start: 2021-09-03 | End: 2021-09-13

## 2021-09-03 RX ORDER — METOPROLOL TARTRATE 50 MG/1
TABLET, FILM COATED ORAL
Qty: 180 TABLET | Refills: 1 | Status: SHIPPED | OUTPATIENT
Start: 2021-09-03 | End: 2021-09-13

## 2021-09-03 RX ORDER — SPIRONOLACTONE 25 MG/1
TABLET ORAL
Qty: 90 TABLET | Refills: 1 | Status: SHIPPED | OUTPATIENT
Start: 2021-09-03 | End: 2021-09-13

## 2021-09-03 NOTE — TELEPHONE ENCOUNTER
Please review.  Protocol Failed or has no Protocol  Requested Prescriptions   Pending Prescriptions Disp Refills    ATORVASTATIN 80 MG Oral Tab [Pharmacy Med Name: ATORVASTATIN 80 MG TABLET] 90 tablet 1     Sig: TAKE ONE TABLET BY MOUTH ONCE NIGHTLY Passed - Appointment in past 6 or next 3 months        Passed - GFR Non- > 50     Lab Results   Component Value Date    GFRNAA 93 10/28/2019                   Future Appointments         Provider Department Appt Notes    In 1 week Eliane Ramsey,

## 2021-09-13 ENCOUNTER — OFFICE VISIT (OUTPATIENT)
Dept: INTERNAL MEDICINE CLINIC | Facility: CLINIC | Age: 41
End: 2021-09-13
Payer: MEDICAID

## 2021-09-13 VITALS
BODY MASS INDEX: 31.5 KG/M2 | RESPIRATION RATE: 14 BRPM | OXYGEN SATURATION: 100 % | WEIGHT: 196 LBS | SYSTOLIC BLOOD PRESSURE: 116 MMHG | HEART RATE: 88 BPM | HEIGHT: 66 IN | DIASTOLIC BLOOD PRESSURE: 80 MMHG

## 2021-09-13 DIAGNOSIS — I10 ESSENTIAL HYPERTENSION: ICD-10-CM

## 2021-09-13 DIAGNOSIS — I25.10 CORONARY ARTERY DISEASE INVOLVING NATIVE CORONARY ARTERY OF NATIVE HEART WITHOUT ANGINA PECTORIS: Primary | ICD-10-CM

## 2021-09-13 DIAGNOSIS — Z00.00 ADULT GENERAL MEDICAL EXAM: ICD-10-CM

## 2021-09-13 DIAGNOSIS — E78.5 DYSLIPIDEMIA: ICD-10-CM

## 2021-09-13 PROCEDURE — 3079F DIAST BP 80-89 MM HG: CPT | Performed by: INTERNAL MEDICINE

## 2021-09-13 PROCEDURE — 3008F BODY MASS INDEX DOCD: CPT | Performed by: INTERNAL MEDICINE

## 2021-09-13 PROCEDURE — 3074F SYST BP LT 130 MM HG: CPT | Performed by: INTERNAL MEDICINE

## 2021-09-13 PROCEDURE — 99214 OFFICE O/P EST MOD 30 MIN: CPT | Performed by: INTERNAL MEDICINE

## 2021-09-13 RX ORDER — METOPROLOL TARTRATE 50 MG/1
50 TABLET, FILM COATED ORAL 2 TIMES DAILY
Qty: 180 TABLET | Refills: 1 | Status: SHIPPED | OUTPATIENT
Start: 2021-09-13

## 2021-09-13 RX ORDER — CLOPIDOGREL BISULFATE 75 MG/1
75 TABLET ORAL DAILY
Qty: 90 TABLET | Refills: 1 | Status: SHIPPED | OUTPATIENT
Start: 2021-09-13

## 2021-09-13 RX ORDER — ASPIRIN 81 MG/1
81 TABLET ORAL DAILY
Qty: 30 TABLET | Refills: 0 | Status: SHIPPED | OUTPATIENT
Start: 2021-09-13 | End: 2021-10-11

## 2021-09-13 RX ORDER — ATORVASTATIN CALCIUM 80 MG/1
80 TABLET, FILM COATED ORAL NIGHTLY
Qty: 90 TABLET | Refills: 1 | Status: SHIPPED | OUTPATIENT
Start: 2021-09-13

## 2021-09-13 RX ORDER — SPIRONOLACTONE 25 MG/1
25 TABLET ORAL DAILY
Qty: 90 TABLET | Refills: 1 | Status: SHIPPED | OUTPATIENT
Start: 2021-09-13

## 2021-09-13 RX ORDER — LISINOPRIL 5 MG/1
5 TABLET ORAL DAILY
Qty: 90 TABLET | Refills: 1 | Status: SHIPPED | OUTPATIENT
Start: 2021-09-13

## 2021-09-13 NOTE — PROGRESS NOTES
Swetha Boston is a 35year old male. Patient presents with: Follow - Up: 6 mo f/u     HPI:   78-year-old gentleman with a past medical history of coronary disease status post PCI, hypertension, dyslipidemia here for follow-up.   He reported he is doing wel voice change. Respiratory: Negative for cough and shortness of breath. Cardiovascular: Negative for chest pain. Gastrointestinal: Negative for abdominal distention, abdominal pain and vomiting. Genitourinary: Negative for hematuria.    Skin: Negat beta-blockers, ACE inhibitors and statins.  - CARDIO - INTERNAL    2. Essential hypertension  He is on ACE inhibitor and Aldactone. Need to repeat potassium level. Discussed low-salt diet    3. Dyslipidemia  Continue statins.   Check lipid profile and LFT

## 2021-10-11 RX ORDER — ASPIRIN 81 MG/1
81 TABLET ORAL DAILY
Qty: 30 TABLET | Refills: 0 | Status: SHIPPED | OUTPATIENT
Start: 2021-10-11 | End: 2021-11-24

## 2021-10-11 NOTE — TELEPHONE ENCOUNTER
Medication pended for approval.        Refill passed per [place] [facility] [place], [facility] protocol.   Requested Prescriptions   Pending Prescriptions Disp Refills    ASPIRIN LOW DOSE 81 MG Oral Tab EC [Pharmacy Med Name: ASPIRIN EC 81 MG TABLET] 30 tablet 0     Sig: TAKE ON

## 2021-10-22 ENCOUNTER — OFFICE VISIT (OUTPATIENT)
Dept: CARDIOLOGY CLINIC | Facility: CLINIC | Age: 41
End: 2021-10-22
Payer: MEDICAID

## 2021-10-22 VITALS
WEIGHT: 200 LBS | RESPIRATION RATE: 18 BRPM | DIASTOLIC BLOOD PRESSURE: 85 MMHG | HEIGHT: 66 IN | BODY MASS INDEX: 32.14 KG/M2 | HEART RATE: 90 BPM | SYSTOLIC BLOOD PRESSURE: 125 MMHG

## 2021-10-22 DIAGNOSIS — I51.9 LV DYSFUNCTION: ICD-10-CM

## 2021-10-22 DIAGNOSIS — I10 ESSENTIAL HYPERTENSION: ICD-10-CM

## 2021-10-22 DIAGNOSIS — I25.10 CORONARY ARTERY DISEASE INVOLVING NATIVE CORONARY ARTERY OF NATIVE HEART WITHOUT ANGINA PECTORIS: Primary | ICD-10-CM

## 2021-10-22 DIAGNOSIS — E78.5 DYSLIPIDEMIA: ICD-10-CM

## 2021-10-22 PROCEDURE — 3074F SYST BP LT 130 MM HG: CPT | Performed by: INTERNAL MEDICINE

## 2021-10-22 PROCEDURE — 3079F DIAST BP 80-89 MM HG: CPT | Performed by: INTERNAL MEDICINE

## 2021-10-22 PROCEDURE — 3008F BODY MASS INDEX DOCD: CPT | Performed by: INTERNAL MEDICINE

## 2021-10-22 PROCEDURE — 93000 ELECTROCARDIOGRAM COMPLETE: CPT | Performed by: INTERNAL MEDICINE

## 2021-10-22 PROCEDURE — 99244 OFF/OP CNSLTJ NEW/EST MOD 40: CPT | Performed by: INTERNAL MEDICINE

## 2021-10-22 NOTE — PATIENT INSTRUCTIONS
Continue current medication. Blood test as ordered by the primary care. 2D echocardiogram within next few days. Follow-up with me in 4 weeks or sooner if needed.

## 2021-10-22 NOTE — PROGRESS NOTES
Rakan Cohn is a 35year old male. Patient presents with:  Consult  CAD    HPI:   Patient is here for a new patient appointment for me. He was seen by Dr. Jesus Zazueta when he went to the hospital with non-STEMI in 2019.   He underwent a stenting of the thrombos BMI 32.28 kg/m²   GENERAL: well developed, well nourished,in no apparent distress  SKIN: no rashes,no suspicious lesions  HEENT: atraumatic, normocephalic,ears and throat are clear  NECK: supple,no adenopathy,no bruits  LUNGS: clear to auscultation  CARDIO

## 2021-10-26 ENCOUNTER — HOSPITAL ENCOUNTER (OUTPATIENT)
Dept: CV DIAGNOSTICS | Facility: HOSPITAL | Age: 41
Discharge: HOME | End: 2021-10-26
Attending: INTERNAL MEDICINE
Payer: MEDICAID

## 2021-10-26 DIAGNOSIS — E78.5 DYSLIPIDEMIA: ICD-10-CM

## 2021-10-26 DIAGNOSIS — I10 ESSENTIAL HYPERTENSION: ICD-10-CM

## 2021-10-26 DIAGNOSIS — I25.10 CORONARY ARTERY DISEASE INVOLVING NATIVE CORONARY ARTERY OF NATIVE HEART WITHOUT ANGINA PECTORIS: ICD-10-CM

## 2021-10-26 PROCEDURE — 93306 TTE W/DOPPLER COMPLETE: CPT | Performed by: INTERNAL MEDICINE

## 2021-11-02 ENCOUNTER — LAB ENCOUNTER (OUTPATIENT)
Dept: LAB | Facility: HOSPITAL | Age: 41
End: 2021-11-02
Attending: INTERNAL MEDICINE
Payer: MEDICAID

## 2021-11-02 DIAGNOSIS — Z00.00 ADULT GENERAL MEDICAL EXAM: ICD-10-CM

## 2021-11-02 PROCEDURE — 80061 LIPID PANEL: CPT

## 2021-11-02 PROCEDURE — 36415 COLL VENOUS BLD VENIPUNCTURE: CPT

## 2021-11-02 PROCEDURE — 83036 HEMOGLOBIN GLYCOSYLATED A1C: CPT

## 2021-11-02 PROCEDURE — 85027 COMPLETE CBC AUTOMATED: CPT

## 2021-11-02 PROCEDURE — 84443 ASSAY THYROID STIM HORMONE: CPT

## 2021-11-02 PROCEDURE — 80053 COMPREHEN METABOLIC PANEL: CPT

## 2021-11-24 RX ORDER — ASPIRIN 81 MG/1
81 TABLET ORAL DAILY
Qty: 90 TABLET | Refills: 1 | Status: SHIPPED | OUTPATIENT
Start: 2021-11-24

## 2021-11-24 NOTE — TELEPHONE ENCOUNTER
Refill passed per [facility], [facility] protocol.    Requested Prescriptions   Pending Prescriptions Disp Refills    ASPIRIN LOW DOSE 81 MG Oral Tab EC [Pharmacy Med Name: ASPIRIN EC 81 MG TABLET] 30 tablet 0     Sig: TAKE ONE TABLET BY MOUTH DAILY        Aspirin

## 2022-01-10 ENCOUNTER — OFFICE VISIT (OUTPATIENT)
Dept: INTERNAL MEDICINE CLINIC | Facility: CLINIC | Age: 42
End: 2022-01-10
Payer: MEDICAID

## 2022-01-10 VITALS
HEART RATE: 87 BPM | WEIGHT: 193 LBS | DIASTOLIC BLOOD PRESSURE: 85 MMHG | HEIGHT: 66 IN | SYSTOLIC BLOOD PRESSURE: 125 MMHG | BODY MASS INDEX: 31.02 KG/M2

## 2022-01-10 DIAGNOSIS — E78.5 DYSLIPIDEMIA: ICD-10-CM

## 2022-01-10 DIAGNOSIS — I10 ESSENTIAL HYPERTENSION: ICD-10-CM

## 2022-01-10 DIAGNOSIS — E11.9 TYPE 2 DIABETES MELLITUS WITHOUT COMPLICATION, WITHOUT LONG-TERM CURRENT USE OF INSULIN (HCC): ICD-10-CM

## 2022-01-10 DIAGNOSIS — I25.10 CORONARY ARTERY DISEASE INVOLVING NATIVE CORONARY ARTERY OF NATIVE HEART WITHOUT ANGINA PECTORIS: Primary | ICD-10-CM

## 2022-01-10 PROCEDURE — 3008F BODY MASS INDEX DOCD: CPT | Performed by: INTERNAL MEDICINE

## 2022-01-10 PROCEDURE — 3074F SYST BP LT 130 MM HG: CPT | Performed by: INTERNAL MEDICINE

## 2022-01-10 PROCEDURE — 3079F DIAST BP 80-89 MM HG: CPT | Performed by: INTERNAL MEDICINE

## 2022-01-10 PROCEDURE — 99214 OFFICE O/P EST MOD 30 MIN: CPT | Performed by: INTERNAL MEDICINE

## 2022-01-10 NOTE — PROGRESS NOTES
Ran Villavicencio is a 35year old male. Patient presents with: Follow - Up  CAD    HPI:   57-year-old gentleman with medical history significant for coronary disease, diabetes, hypertension here for follow-up. He reports that he is doing well.  D chest pain. Gastrointestinal: Negative for abdominal distention, abdominal pain and vomiting. Genitourinary: Negative for hematuria. Skin: Negative for wound. Psychiatric/Behavioral: Negative for behavioral problems.    Wt Readings from Last 5 Encou regimen. Kidney function and thyroid function test reviewed. 3. Dyslipidemia  Continue statins. His main problem is triglyceridemia. LDL is 70. He was not taking Lovazal regularly.  Encourage patient to take omega-3 fatty acid regularly along with statin

## 2022-06-01 RX ORDER — ASPIRIN 81 MG/1
81 TABLET ORAL DAILY
Qty: 90 TABLET | Refills: 1 | Status: SHIPPED | OUTPATIENT
Start: 2022-06-01

## 2022-06-01 NOTE — TELEPHONE ENCOUNTER
Refill passed per [address] protocol    Requested Prescriptions   Pending Prescriptions Disp Refills    ASPIRIN LOW DOSE 81 MG Oral Tab EC [Pharmacy Med Name: ASPIRIN EC 81 MG TABLET] 90 tablet 1     Sig: TAKE ONE TABLET BY MOUTH DAILY        Aspirin Protocol Passed - 6/1/2022  5:00 AM        Passed - Appointment in past 6 or next 3 months                  Recent Outpatient Visits              4 months ago Coronary artery disease involving native coronary artery of native heart without angina pectoris    [address]    [facility] Visit    7 months ago Coronary artery disease involving native coronary artery of native heart without angina pectoris    [facility] Melissa Martin MD    Office Visit    8 months ago Coronary artery disease involving native coronary artery of native heart without angina pectoris    Víctor Thao MD    Office Visit    1 year ago Adult general medical exam    Víctor Gonzales MD    Office Visit    2 years ago Essential hypertension    Víctor Gonzales MD    Virtual Phone E/M

## 2022-09-09 RX ORDER — SPIRONOLACTONE 25 MG/1
TABLET ORAL
Qty: 90 TABLET | Refills: 1 | Status: SHIPPED | OUTPATIENT
Start: 2022-09-09

## 2022-09-09 RX ORDER — METOPROLOL TARTRATE 50 MG/1
TABLET, FILM COATED ORAL
Qty: 180 TABLET | Refills: 1 | Status: SHIPPED | OUTPATIENT
Start: 2022-09-09

## 2022-09-09 RX ORDER — ATORVASTATIN CALCIUM 80 MG/1
TABLET, FILM COATED ORAL
Qty: 90 TABLET | Refills: 1 | Status: SHIPPED | OUTPATIENT
Start: 2022-09-09

## 2022-09-09 RX ORDER — CLOPIDOGREL BISULFATE 75 MG/1
TABLET ORAL
Qty: 90 TABLET | Refills: 1 | Status: SHIPPED | OUTPATIENT
Start: 2022-09-09

## 2022-09-09 RX ORDER — LISINOPRIL 5 MG/1
TABLET ORAL
Qty: 90 TABLET | Refills: 1 | Status: SHIPPED | OUTPATIENT
Start: 2022-09-09

## 2022-09-09 NOTE — TELEPHONE ENCOUNTER
Protocol failed or has No Protocol, please review    Routing to PCP due to high volumes and staffing issues    Requested Prescriptions   Pending Prescriptions Disp Refills    CLOPIDOGREL 75 MG Oral Tab [Pharmacy Med Name: CLOPIDOGREL 75 MG TABLET] 90 tablet 1     Sig: TAKE ONE TABLET BY MOUTH DAILY        There is no refill protocol information for this order        LISINOPRIL 5 MG Oral Tab [Pharmacy Med Name: LISINOPRIL 5 MG TABLET] 90 tablet 1     Sig: TAKE ONE TABLET BY MOUTH DAILY        Hypertensive Medications Protocol Failed - 9/8/2022  5:00 AM        Failed - CMP or BMP in past 6 months     No results found for this or any previous visit (from the past 4392 hour(s)).               Passed - In person appointment in the past 12 or next 3 months       Recent Outpatient Visits              8 months ago Coronary artery disease involving native coronary artery of native heart without angina pectoris    [facility], [facility] Young Shanks MD    Office Visit    10 months ago Coronary artery disease involving native coronary artery of native heart without angina pectoris    [facility] Rogerio Pfeiffer MD    Office Visit    12 months ago Coronary artery disease involving native coronary artery of native heart without angina pectoris    Young Dolan MD    Office Visit    1 year ago Adult general medical exam    Young Hartley MD    Office Visit    2 years ago Essential hypertension    [facility], Young Goodrich MD    Virtual Phone E/M                 Passed - Last BP reading less than 140/90     BP Readings from Last 1 Encounters:  01/10/22 : 125/85                Passed - In person appointment or virtual visit in the past 6 months       Recent Outpatient Visits              8 months ago Coronary artery disease involving native coronary artery of native heart without angina pectoris    [facility] [place], [facility] [place] Shanks, Meri Soria MD    Office Visit    10 months ago Coronary artery disease involving native coronary artery of native heart without angina pectoris    [facility] Klaudia Hahn MD    Office Visit    12 months ago Coronary artery disease involving native coronary artery of native heart without angina pectoris    Nohemy Agrawal MD    Office Visit    1 year ago Adult general medical exam    [facility] [facility] [place], [address], Meri Jacinto MD    Office Visit    2 years ago Essential hypertension    [address] [place], [address], Nohemy Sethi MD    Virtual Phone E/M                 Passed - GFR > 50     No results found for: EGFRCR                 SPIRONOLACTONE 25 MG Oral Tab [Pharmacy Med Name: SPIRONOLACTONE 25 MG TABLET] 90 tablet 1     Sig: TAKE ONE TABLET BY MOUTH DAILY        Hypertensive Medications Protocol Failed - 9/8/2022  5:00 AM        Failed - CMP or BMP in past 6 months     No results found for this or any previous visit (from the past 4392 hour(s)).               Passed - In person appointment in the past 12 or next 3 months       Recent Outpatient Visits              8 months ago Coronary artery disease involving native coronary artery of native heart without angina pectoris    [facility], 148 Nohemy Gray MD    Office Visit    10 months ago Coronary artery disease involving native coronary artery of native heart without angina pectoris    [facility] Klaudia Hahn MD    Office Visit    12 months ago Coronary artery disease involving native coronary artery of native heart without angina pectoris    Nohemy Agrawal MD    Office Visit    1 year ago Adult general medical exam    Julia Aguilera MD Office Visit    2 years ago Essential hypertension    [address], [address], Rohit Jacinto MD    Virtual Phone E/M                 Passed - Last BP reading less than 140/90     BP Readings from Last 1 Encounters:  01/10/22 : 125/85                Passed - In person appointment or virtual visit in the past 6 months       Recent Outpatient Visits              8 months ago Coronary artery disease involving native coronary artery of native heart without angina pectoris    [address] [place], [facility] [place] Jarred[facility]    Office Visit    10 months ago Coronary artery disease involving native coronary artery of native heart without angina pectoris    [facility] Rafa Melchor MD    Office Visit    12 months ago Coronary artery disease involving native coronary artery of native heart without angina pectoris    Wilma Sell, Jennet Landau, MD    Office Visit    1 year ago Adult general medical exam    [place] Ramsey Jarred, MD    Office Visit    2 years ago Essential hypertension    [address] [place], [address] [facility] [address], Jennet Landau, MD    Virtual Phone E/M                 Passed - GFR > 50     No results found for: EGFRCR                 METOPROLOL TARTRATE 50 MG Oral Tab [Pharmacy Med Name: METOPROLOL TARTRATE 50 MG TAB] 180 tablet 1     Sig: TAKE ONE TABLET BY MOUTH TWICE A DAY        Hypertensive Medications Protocol Failed - 9/8/2022  5:00 AM        Failed - CMP or BMP in past 6 months     No results found for this or any previous visit (from the past 4392 hour(s)).               Passed - In person appointment in the past 12 or next 3 months       Recent Outpatient Visits              8 months ago Coronary artery disease involving native coronary artery of native heart without angina pectoris    Wilma Sell, Jennet Landau, MD    Office Visit    10 months ago Coronary artery disease involving native coronary artery of native heart without angina pectoris    [facility] Surinder Zaldivar MD    Office Visit    12 months ago Coronary artery disease involving native coronary artery of native heart without angina pectoris    Malini Byrd MD    Office Visit    1 year ago Adult general medical exam    [address] [place], [address], Malini Villanueva MD    Office Visit    2 years ago Essential hypertension    [address] [place], [address], Malini Villanueva MD    Virtual Phone E/M                 Passed - Last BP reading less than 140/90     BP Readings from Last 1 Encounters:  01/10/22 : 125/85                Passed - In person appointment or virtual visit in the past 6 months       Recent Outpatient Visits              8 months ago Coronary artery disease involving native coronary artery of native heart without angina pectoris    [address] [place], [facility]    [facility]    10 months ago Coronary artery disease involving native coronary artery of native heart without angina pectoris    [facility] Surinder Zaldivar MD    Office Visit    12 months ago Coronary artery disease involving native coronary artery of native heart without angina pectoris    Malini Byrd MD    Office Visit    1 year ago Adult general medical exam    Malini Jones MD    Office Visit    2 years ago Essential hypertension    Malini Jones MD    Virtual Phone E/M                 Passed - GFR > 50     No results found for: [facility]                Signed Prescriptions Disp Refills    ATORVASTATIN 80 MG Oral Tab 90 tablet 1     Sig: TAKE ONE TABLET BY MOUTH ONCE NIGHTLY        Cholesterol Medication Protocol Passed - 9/8/2022  5:00 AM Passed - ALT in past 12 months        Passed - LDL in past 12 months        Passed - Last ALT < 80       Lab Results   Component Value Date    ALT 26 11/02/2021             Passed - Last LDL < 130     Lab Results   Component Value Date    LDL 70 11/02/2021               Passed - In person appointment or virtual visit in the past 12 mos or appointment in next 3 mos       Recent Outpatient Visits              8 months ago Coronary artery disease involving native coronary artery of native heart without angina pectoris    [facility], [address] [facility], Stephani Kimble MD    Office Visit    10 months ago Coronary artery disease involving native coronary artery of native heart without angina pectoris    [facility]iology Yimi Martinez MD    Office Visit    12 months ago Coronary artery disease involving native coronary artery of native heart without angina pectoris    [place] Michael Valdes MD    Office Visit    1 year ago Adult general medical exam    [facility], Michael Fields MD    Office Visit    2 years ago Essential hypertension    [facility], [place]    Virtual Phone E/M                       Recent Outpatient Visits              8 months ago Coronary artery disease involving native coronary artery of native heart without angina pectoris    [place] Michael Valdes MD    Office Visit    10 months ago Coronary artery disease involving native coronary artery of native heart without angina pectoris    [facility] Yimi Martinez MD    Office Visit    12 months ago Coronary artery disease involving native coronary artery of native heart without angina pectoris    [place] Michael Valdes MD    Office Visit    1 year ago Adult general medical exam    [place] [address] [place], [facility], [facility] Bony Landry MD    Office Visit    2 years ago Essential hypertension    Faye Lindsey, Theresa Keyes MD    Virtual Phone E/M

## 2022-09-09 NOTE — TELEPHONE ENCOUNTER
Refill passed per [employer] protocol.   Requested Prescriptions   Pending Prescriptions Disp Refills    CLOPIDOGREL 75 MG Oral Tab [Pharmacy Med Name: CLOPIDOGREL 75 MG TABLET] 90 tablet 1     Sig: TAKE ONE TABLET BY MOUTH DAILY        There is no refill protocol information for this order        ATORVASTATIN 80 MG Oral Tab [Pharmacy Med Name: ATORVASTATIN 80 MG TABLET] 90 tablet 1     Sig: TAKE ONE TABLET BY MOUTH ONCE NIGHTLY        Cholesterol Medication Protocol Passed - 9/8/2022  5:00 AM        Passed - ALT in past 12 months        Passed - LDL in past 12 months        Passed - Last ALT < 80       Lab Results   Component Value Date    ALT 26 11/02/2021             Passed - Last LDL < 130     Lab Results   Component Value Date    LDL 70 11/02/2021               Passed - In person appointment or virtual visit in the past 12 mos or appointment in next 3 mos       Recent Outpatient Visits              8 months ago Coronary artery disease involving native coronary artery of native heart without angina pectoris    [employer], [address] [facility] MD Radha    Office Visit    10 months ago Coronary artery disease involving native coronary artery of native heart without angina pectoris    [facility] Yamilet Bazan MD    Office Visit    12 months ago Coronary artery disease involving native coronary artery of native heart without angina pectoris    Elba Briseno MD    Office Visit    1 year ago Adult general medical exam    Elba Adams MD    Office Visit    2 years ago Essential hypertension    Elba Adams MD    Virtual Phone E/M                    LISINOPRIL 5 MG Oral Tab [Pharmacy Med Name: LISINOPRIL 5 MG TABLET] 90 tablet 1     Sig: TAKE ONE TABLET BY MOUTH DAILY        Hypertensive Medications Protocol Failed - 9/8/2022  5:00 AM Failed - CMP or BMP in past 6 months     No results found for this or any previous visit (from the past 4392 hour(s)).               Passed - In person appointment in the past 12 or next 3 months       Recent Outpatient Visits              8 months ago Coronary artery disease involving native coronary artery of native heart without angina pectoris    [address], [address] [place] Dorothy Saras, MD    Office Visit    10 months ago Coronary artery disease involving native coronary artery of native heart without angina pectoris    [facility] Viki Julian MD    Office Visit    12 months ago Coronary artery disease involving native coronary artery of native heart without angina pectoris    Wilian Ceja MD    Office Visit    1 year ago Adult general medical exam    [address], [address], Wilian Lopez MD    Office Visit    2 years ago Essential hypertension    [address], [address], Ann Jacinto MD    Virtual Phone E/M                 Passed - Last BP reading less than 140/90     BP Readings from Last 1 Encounters:  01/10/22 : 125/85                Passed - In person appointment or virtual visit in the past 6 months       Recent Outpatient Visits              8 months ago Coronary artery disease involving native coronary artery of native heart without angina pectoris    [address], [address] Wilian Gray MD    Office Visit    10 months ago Coronary artery disease involving native coronary artery of native heart without angina pectoris    [facility] Viki Julian MD    Office Visit    12 months ago Coronary artery disease involving native coronary artery of native heart without angina pectoris    Wilian Ceja MD    Office Visit    1 year ago Adult general medical exam    [address] [facility] Yajaira Mitchell MD    Office Visit    2 years ago Essential hypertension    [address] [place] [facility] Josh [facility]    Virtual Phone E/M                 Passed - GFR > 50     No results found for: EGFRCR                 SPIRONOLACTONE 25 MG Oral Tab [Pharmacy Med Name: SPIRONOLACTONE 25 MG TABLET] 90 tablet 1     Sig: TAKE ONE TABLET BY MOUTH DAILY        Hypertensive Medications Protocol Failed - 9/8/2022  5:00 AM        Failed - CMP or BMP in past 6 months     No results found for this or any previous visit (from the past 4392 hour(s)).               Passed - In person appointment in the past 12 or next 3 months       Recent Outpatient Visits              8 months ago Coronary artery disease involving native coronary artery of native heart without angina pectoris    [facility], [address] [facility] BrackenYoung duron MD    Office Visit    10 months ago Coronary artery disease involving native coronary artery of native heart without angina pectoris    [facility] Meir Brioens MD    Office Visit    12 months ago Coronary artery disease involving native coronary artery of native heart without angina pectoris    Young Martin MD    Office Visit    1 year ago Adult general medical exam    [address] Wagoner, MD    Office Visit    2 years ago Essential hypertension    [address] [place], MD    Virtual Phone E/M                 Passed - Last BP reading less than 140/90     BP Readings from Last 1 Encounters:  01/10/22 : 125/85                Passed - In person appointment or virtual visit in the past 6 months       Recent Outpatient Visits              8 months ago Coronary artery disease involving native coronary artery of native heart without angina pectoris    Young Martin MD    Office Visit    10 months ago Coronary artery disease involving native coronary artery of native heart without angina pectoris    [facility]iology Zenon Knox MD    Office Visit    12 months ago Coronary artery disease involving native coronary artery of native heart without angina pectoris    Marques Simons MD    Office Visit    1 year ago Adult general medical exam    [address], [place], Marques Santoyo MD    Office Visit    2 years ago Essential hypertension    [address] [place], Marques Santoyo MD    Virtual Phone E/M                 Passed - GFR > 50     No results found for: EGFRCR                 METOPROLOL TARTRATE 50 MG Oral Tab [Pharmacy Med Name: METOPROLOL TARTRATE 50 MG TAB] 180 tablet 1     Sig: TAKE ONE TABLET BY MOUTH TWICE A DAY        Hypertensive Medications Protocol Failed - 9/8/2022  5:00 AM        Failed - CMP or BMP in past 6 months     No results found for this or any previous visit (from the past 4392 hour(s)).               Passed - In person appointment in the past 12 or next 3 months       Recent Outpatient Visits              8 months ago Coronary artery disease involving native coronary artery of native heart without angina pectoris    [facility], [facility]    Office Visit    10 months ago Coronary artery disease involving native coronary artery of native heart without angina pectoris    [facility] Cardiology Zenon Knox MD    Office Visit    12 months ago Coronary artery disease involving native coronary artery of native heart without angina pectoris    Marques Simons MD    Office Visit    1 year ago Adult general medical exam    Tiera Disla MD    Office Visit    2 years ago Essential hypertension    [address] [facility], [address] [place] [address] Yanni Byrd MD    Virtual Phone E/M                 Passed - Last BP reading less than 140/90     BP Readings from Last 1 Encounters:  01/10/22 : 125/85                Passed - In person appointment or virtual visit in the past 6 months       Recent Outpatient Visits              8 months ago Coronary artery disease involving native coronary artery of native heart without angina pectoris    [address] [facility] [place], [address] [facility] Giulia Berg [facility]    Office Visit    10 months ago Coronary artery disease involving native coronary artery of native heart without angina pectoris    [facility] Sarahi Renner MD    Office Visit    12 months ago Coronary artery disease involving native coronary artery of native heart without angina pectoris    Jake Summers MD    Office Visit    1 year ago Adult general medical exam    [facility], Jake Kerr MD    Office Visit    2 years ago Essential hypertension    [address] [place], [address] [facility] [address], Jake Kerr MD    Virtual Phone E/M                 Passed - GFR > 50     No results found for: [facility]                  Recent Outpatient Visits              8 months ago Coronary artery disease involving native coronary artery of native heart without angina pectoris    [address], [address] Jake Gray MD    Office Visit    10 months ago Coronary artery disease involving native coronary artery of native heart without angina pectoris    [facility] Sarahi Renner MD    Office Visit    12 months ago Coronary artery disease involving native coronary artery of native heart without angina pectoris    Jake Summers MD    Office Visit    1 year ago Adult general medical exam    [address] [facility], Jake Kerr MD    Office Visit    2 years ago Essential hypertension    Eloisa Salinas, Natalya Kirby MD    Virtual Phone E/M

## 2022-10-19 NOTE — PROGRESS NOTES
Message left for patient that Dr. Stoner would like a CT done prior to her follow-up appointment next week - CT scheduled at [facility] on 10/25 at 12:45pm. Instructions left for patient to arrive at the Radiology Department on the Ground Floor of the [facility] at 12:15 - only clear liquids after 9:45 am that day. I encouraged her to call the office with any questions. I also gave her the # for scheduling ([phone]) if she needs to make any changes to this appointment.   [facility]    Progress Note    Raul Nelson Patient Status:  Inpatient    3/11/1980 MRN [id number]   Location [facility] 3W/SW Attending Virginia Yang MD   Hosp Day # 2 PCP None Pcp        Subjective:     Respiratory: Ne 06/14/2019    .0 06/14/2019    CREATSERUM 0.94 06/14/2019    BUN 11 06/14/2019     06/14/2019    K 3.7 06/14/2019     06/14/2019    CO2 28.0 06/14/2019     (H) 06/14/2019    CA 9.0 06/14/2019    PTT 27.1 06/13/2019    INR 1.12 06/

## 2022-12-08 RX ORDER — ASPIRIN 81 MG/1
TABLET, COATED ORAL
Qty: 90 TABLET | Refills: 1 | Status: SHIPPED | OUTPATIENT
Start: 2022-12-08

## 2022-12-08 NOTE — TELEPHONE ENCOUNTER
Medication approved. He will be due for appointment. Last seen January 2022. Please instruct him to make appointment to see me in Jan 2023.   Thank you

## 2023-01-10 ENCOUNTER — NURSE TRIAGE (OUTPATIENT)
Dept: INTERNAL MEDICINE CLINIC | Facility: CLINIC | Age: 43
End: 2023-01-10

## 2023-04-01 ENCOUNTER — OFFICE VISIT (OUTPATIENT)
Facility: CLINIC | Age: 43
End: 2023-04-01

## 2023-04-01 VITALS
OXYGEN SATURATION: 99 % | DIASTOLIC BLOOD PRESSURE: 70 MMHG | SYSTOLIC BLOOD PRESSURE: 126 MMHG | BODY MASS INDEX: 30.86 KG/M2 | WEIGHT: 192 LBS | HEIGHT: 66 IN | RESPIRATION RATE: 14 BRPM | HEART RATE: 78 BPM

## 2023-04-01 DIAGNOSIS — I10 ESSENTIAL HYPERTENSION: ICD-10-CM

## 2023-04-01 DIAGNOSIS — Z12.5 SCREENING PSA (PROSTATE SPECIFIC ANTIGEN): ICD-10-CM

## 2023-04-01 DIAGNOSIS — I25.10 CORONARY ARTERY DISEASE INVOLVING NATIVE CORONARY ARTERY OF NATIVE HEART WITHOUT ANGINA PECTORIS: Primary | ICD-10-CM

## 2023-04-01 DIAGNOSIS — Z12.11 SCREEN FOR COLON CANCER: ICD-10-CM

## 2023-04-01 DIAGNOSIS — E11.9 TYPE 2 DIABETES MELLITUS WITHOUT COMPLICATION, WITHOUT LONG-TERM CURRENT USE OF INSULIN (HCC): ICD-10-CM

## 2023-04-01 DIAGNOSIS — Z00.00 ADULT GENERAL MEDICAL EXAM: ICD-10-CM

## 2023-04-01 PROCEDURE — 3078F DIAST BP <80 MM HG: CPT | Performed by: INTERNAL MEDICINE

## 2023-04-01 PROCEDURE — 99214 OFFICE O/P EST MOD 30 MIN: CPT | Performed by: INTERNAL MEDICINE

## 2023-04-01 PROCEDURE — 3008F BODY MASS INDEX DOCD: CPT | Performed by: INTERNAL MEDICINE

## 2023-04-01 PROCEDURE — 3074F SYST BP LT 130 MM HG: CPT | Performed by: INTERNAL MEDICINE

## 2023-04-01 RX ORDER — METOPROLOL TARTRATE 50 MG/1
50 TABLET, FILM COATED ORAL 2 TIMES DAILY
Qty: 180 TABLET | Refills: 3 | Status: SHIPPED | OUTPATIENT
Start: 2023-04-01

## 2023-04-01 RX ORDER — ATORVASTATIN CALCIUM 80 MG/1
80 TABLET, FILM COATED ORAL NIGHTLY
Qty: 90 TABLET | Refills: 3 | Status: SHIPPED | OUTPATIENT
Start: 2023-04-01

## 2023-04-01 RX ORDER — SPIRONOLACTONE 25 MG/1
25 TABLET ORAL DAILY
Qty: 90 TABLET | Refills: 3 | Status: SHIPPED | OUTPATIENT
Start: 2023-04-01

## 2023-04-01 RX ORDER — LISINOPRIL 5 MG/1
5 TABLET ORAL DAILY
Qty: 90 TABLET | Refills: 3 | Status: SHIPPED | OUTPATIENT
Start: 2023-04-01

## 2023-04-01 RX ORDER — CLOPIDOGREL BISULFATE 75 MG/1
75 TABLET ORAL DAILY
Qty: 90 TABLET | Refills: 3 | Status: SHIPPED | OUTPATIENT
Start: 2023-04-01

## 2023-04-01 RX ORDER — OMEGA-3 FATTY ACIDS/FISH OIL 300-1000MG
1 CAPSULE ORAL 2 TIMES DAILY
Qty: 180 CAPSULE | Refills: 3 | Status: SHIPPED | OUTPATIENT
Start: 2023-04-01 | End: 2023-06-30

## 2023-07-01 RX ORDER — ASPIRIN 81 MG/1
81 TABLET ORAL DAILY
Qty: 90 TABLET | Refills: 3 | Status: SHIPPED | OUTPATIENT
Start: 2023-07-01

## 2023-10-30 ENCOUNTER — LAB ENCOUNTER (OUTPATIENT)
Dept: LAB | Facility: HOSPITAL | Age: 43
End: 2023-10-30
Attending: INTERNAL MEDICINE
Payer: MEDICAID

## 2023-10-30 ENCOUNTER — OFFICE VISIT (OUTPATIENT)
Dept: INTERNAL MEDICINE CLINIC | Facility: CLINIC | Age: 43
End: 2023-10-30

## 2023-10-30 VITALS
WEIGHT: 184 LBS | SYSTOLIC BLOOD PRESSURE: 122 MMHG | HEIGHT: 66 IN | HEART RATE: 80 BPM | BODY MASS INDEX: 29.57 KG/M2 | RESPIRATION RATE: 14 BRPM | OXYGEN SATURATION: 98 % | DIASTOLIC BLOOD PRESSURE: 74 MMHG

## 2023-10-30 DIAGNOSIS — Z00.00 ADULT GENERAL MEDICAL EXAM: Primary | ICD-10-CM

## 2023-10-30 DIAGNOSIS — Z12.11 SCREEN FOR COLON CANCER: ICD-10-CM

## 2023-10-30 DIAGNOSIS — E11.9 TYPE 2 DIABETES MELLITUS WITHOUT COMPLICATION, WITHOUT LONG-TERM CURRENT USE OF INSULIN (HCC): ICD-10-CM

## 2023-10-30 DIAGNOSIS — I25.10 CORONARY ARTERY DISEASE INVOLVING NATIVE CORONARY ARTERY OF NATIVE HEART WITHOUT ANGINA PECTORIS: ICD-10-CM

## 2023-10-30 LAB
ALBUMIN SERPL-MCNC: 3.9 G/DL (ref 3.4–5)
ALBUMIN/GLOB SERPL: 1 {RATIO} (ref 1–2)
ALP LIVER SERPL-CCNC: 41 U/L
ALT SERPL-CCNC: 37 U/L
ANION GAP SERPL CALC-SCNC: 8 MMOL/L (ref 0–18)
AST SERPL-CCNC: 24 U/L (ref 15–37)
BILIRUB SERPL-MCNC: 0.5 MG/DL (ref 0.1–2)
BUN BLD-MCNC: 10 MG/DL (ref 7–18)
BUN/CREAT SERPL: 9.8 (ref 10–20)
CALCIUM BLD-MCNC: 8.9 MG/DL (ref 8.5–10.1)
CHLORIDE SERPL-SCNC: 102 MMOL/L (ref 98–112)
CHOLEST SERPL-MCNC: 240 MG/DL (ref ?–200)
CO2 SERPL-SCNC: 27 MMOL/L (ref 21–32)
CREAT BLD-MCNC: 1.02 MG/DL
CREAT UR-SCNC: 180 MG/DL
DEPRECATED RDW RBC AUTO: 37.3 FL (ref 35.1–46.3)
EGFRCR SERPLBLD CKD-EPI 2021: 92 ML/MIN/1.73M2 (ref 60–?)
ERYTHROCYTE [DISTWIDTH] IN BLOOD BY AUTOMATED COUNT: 11.9 % (ref 11–15)
EST. AVERAGE GLUCOSE BLD GHB EST-MCNC: 140 MG/DL (ref 68–126)
FASTING PATIENT LIPID ANSWER: YES
FASTING STATUS PATIENT QL REPORTED: YES
GLOBULIN PLAS-MCNC: 3.8 G/DL (ref 2.8–4.4)
GLUCOSE BLD-MCNC: 144 MG/DL (ref 70–99)
HBA1C MFR BLD: 6.5 % (ref ?–5.7)
HCT VFR BLD AUTO: 44.6 %
HDLC SERPL-MCNC: 51 MG/DL (ref 40–59)
HGB BLD-MCNC: 15.8 G/DL
LDLC SERPL CALC-MCNC: 98 MG/DL (ref ?–100)
MCH RBC QN AUTO: 31.5 PG (ref 26–34)
MCHC RBC AUTO-ENTMCNC: 35.4 G/DL (ref 31–37)
MCV RBC AUTO: 89 FL
MICROALBUMIN UR-MCNC: 1.68 MG/DL
MICROALBUMIN/CREAT 24H UR-RTO: 9.3 UG/MG (ref ?–30)
NONHDLC SERPL-MCNC: 189 MG/DL (ref ?–130)
OSMOLALITY SERPL CALC.SUM OF ELEC: 286 MOSM/KG (ref 275–295)
PLATELET # BLD AUTO: 226 10(3)UL (ref 150–450)
POTASSIUM SERPL-SCNC: 4.3 MMOL/L (ref 3.5–5.1)
PROT SERPL-MCNC: 7.7 G/DL (ref 6.4–8.2)
PSA SERPL-MCNC: 0.49 NG/ML (ref ?–4)
RBC # BLD AUTO: 5.01 X10(6)UL
SODIUM SERPL-SCNC: 137 MMOL/L (ref 136–145)
TRIGL SERPL-MCNC: 545 MG/DL (ref 30–149)
TSI SER-ACNC: 1.29 MIU/ML (ref 0.36–3.74)
VLDLC SERPL CALC-MCNC: 93 MG/DL (ref 0–30)
WBC # BLD AUTO: 7.4 X10(3) UL (ref 4–11)

## 2023-10-30 PROCEDURE — 82570 ASSAY OF URINE CREATININE: CPT | Performed by: INTERNAL MEDICINE

## 2023-10-30 PROCEDURE — 80053 COMPREHEN METABOLIC PANEL: CPT | Performed by: INTERNAL MEDICINE

## 2023-10-30 PROCEDURE — 99396 PREV VISIT EST AGE 40-64: CPT | Performed by: INTERNAL MEDICINE

## 2023-10-30 PROCEDURE — 84153 ASSAY OF PSA TOTAL: CPT | Performed by: INTERNAL MEDICINE

## 2023-10-30 PROCEDURE — 3008F BODY MASS INDEX DOCD: CPT | Performed by: INTERNAL MEDICINE

## 2023-10-30 PROCEDURE — 84443 ASSAY THYROID STIM HORMONE: CPT | Performed by: INTERNAL MEDICINE

## 2023-10-30 PROCEDURE — 3078F DIAST BP <80 MM HG: CPT | Performed by: INTERNAL MEDICINE

## 2023-10-30 PROCEDURE — 83036 HEMOGLOBIN GLYCOSYLATED A1C: CPT | Performed by: INTERNAL MEDICINE

## 2023-10-30 PROCEDURE — 3044F HG A1C LEVEL LT 7.0%: CPT | Performed by: INTERNAL MEDICINE

## 2023-10-30 PROCEDURE — 3074F SYST BP LT 130 MM HG: CPT | Performed by: INTERNAL MEDICINE

## 2023-10-30 PROCEDURE — 80061 LIPID PANEL: CPT | Performed by: INTERNAL MEDICINE

## 2023-10-30 PROCEDURE — 85027 COMPLETE CBC AUTOMATED: CPT | Performed by: INTERNAL MEDICINE

## 2023-10-30 PROCEDURE — 36415 COLL VENOUS BLD VENIPUNCTURE: CPT | Performed by: INTERNAL MEDICINE

## 2023-10-30 PROCEDURE — 82043 UR ALBUMIN QUANTITATIVE: CPT | Performed by: INTERNAL MEDICINE

## 2024-01-02 ENCOUNTER — TELEPHONE (OUTPATIENT)
Dept: FAMILY MEDICINE CLINIC | Facility: CLINIC | Age: 44
End: 2024-01-02

## 2024-01-02 NOTE — TELEPHONE ENCOUNTER
Left detailed vm for patient to call back and schedule DM eye exam, also provided information on how to fax results if done at another facility. A [employer] message was also sent with this information.

## 2024-04-05 RX ORDER — METOPROLOL TARTRATE 50 MG/1
50 TABLET, FILM COATED ORAL 2 TIMES DAILY
Qty: 180 TABLET | Refills: 3 | Status: SHIPPED | OUTPATIENT
Start: 2024-04-05

## 2024-04-05 RX ORDER — ATORVASTATIN CALCIUM 80 MG/1
80 TABLET, FILM COATED ORAL NIGHTLY
Qty: 90 TABLET | Refills: 3 | Status: SHIPPED | OUTPATIENT
Start: 2024-04-05

## 2024-04-05 NOTE — TELEPHONE ENCOUNTER
Please review.  Protocol failed / Has no protocol.    Please review pended refill request as unable to refill due to high/very high drug interaction warning copied here:    Very High  Drug-Drug: spironolactone and lisinoprilHyperkalemia, possibly with cardiac arrhythmias or arrest, may occur with the combination of potassium-sparing diuretics and ACE inhibitors. Serum potassium concentrations and renal function should be monitored. Patients with renal impairment, diabetes, older age, severe heart failure, and risk for dehydration may be at greater risk.       Requested Prescriptions   Pending Prescriptions Disp Refills    spironolactone 25 MG Oral Tab [Pharmacy Med Name: SPIRONOLACTONE 25 MG TABLET] 90 tablet 3     Sig: Take 1 tablet (25 mg total) by mouth daily.       Hypertension Medications Protocol Passed - 4/5/2024  5:00 AM        Passed - CMP or BMP in past 12 months        Passed - Last BP reading less than 140/90     BP Readings from Last 1 Encounters:   10/30/23 122/74               Passed - In person appointment or virtual visit in the past 12 mos or appointment in next 3 mos     Recent Outpatient Visits              5 months ago Adult general medical exam    [facility]    Office Visit    1 year ago Coronary artery disease involving native coronary artery of native heart without angina pectoris    [facility], [facility] Devaughn Ruano MD    Office Visit    2 years ago Coronary artery disease involving native coronary artery of native heart without angina pectoris    [facility] Devaughn Ruano MD    Office Visit    2 years ago Coronary artery disease involving native coronary artery of native heart without angina pectoris    [facility] Raad Rivera MD    Office Visit    2 years ago Coronary artery disease  involving native coronary artery of native heart without angina pectoris    [facility] Devaughn Ruano MD    Office Visit                      Passed - EGFRCR or GFRNAA > 50     GFR Evaluation  EGFRCR: 92 , resulted on 10/30/2023            clopidogrel 75 MG Oral Tab [Pharmacy Med Name: CLOPIDOGREL 75 MG TABLET] 90 tablet 3     Sig: Take 1 tablet (75 mg total) by mouth daily.       There is no refill protocol information for this order       lisinopril 5 MG Oral Tab [Pharmacy Med Name: LISINOPRIL 5 MG TABLET] 90 tablet 3     Sig: Take 1 tablet (5 mg total) by mouth daily.       Hypertension Medications Protocol Passed - 4/5/2024  5:00 AM        Passed - CMP or BMP in past 12 months        Passed - Last BP reading less than 140/90     BP Readings from Last 1 Encounters:   10/30/23 122/74               Passed - In person appointment or virtual visit in the past 12 mos or appointment in next 3 mos     Recent Outpatient Visits              5 months ago Adult general medical exam    [facility] Devaughn Ruano MD    Office Visit    1 year ago Coronary artery disease involving native coronary artery of native heart without angina pectoris    [facility] Devaughn Ruano MD    Office Visit    2 years ago Coronary artery disease involving native coronary artery of native heart without angina pectoris    [facility] Devaughn Ruano MD    Office Visit    2 years ago Coronary artery disease involving native coronary artery of native heart without angina pectoris    [facility] Raad Rivera MD    Office Visit    2 years ago Coronary artery disease involving native coronary artery of native heart without angina pectoris    [facility] Devaughn Ruano MD     Office Visit                      Passed - EGFRCR or GFRNAA > 50     GFR Evaluation  EGFRCR: 92 , resulted on 10/30/2023           Signed Prescriptions Disp Refills    atorvastatin 80 MG Oral Tab 90 tablet 3     Sig: Take 1 tablet (80 mg total) by mouth nightly.       Cholesterol Medication Protocol Passed - 4/5/2024  5:00 AM        Passed - ALT < 80     Lab Results   Component Value Date    ALT 37 10/30/2023             Passed - ALT resulted within past year        Passed - Lipid panel within past 12 months     Lab Results   Component Value Date    CHOLEST 240 (H) 10/30/2023    TRIG 545 (H) 10/30/2023    HDL 51 10/30/2023    LDL 98 10/30/2023    VLDL 93 (H) 10/30/2023    NONHDLC 189 (H) 10/30/2023             Passed - In person appointment or virtual visit in the past 12 mos or appointment in next 3 mos     Recent Outpatient Visits              5 months ago Adult general medical exam    [facility]    Office Visit    1 year ago Coronary artery disease involving native coronary artery of native heart without angina pectoris    [facility]    [facility]    2 years ago Coronary artery disease involving native coronary artery of native heart without angina pectoris    [facility] Devaughn Posada MD    Office Visit    2 years ago Coronary artery disease involving native coronary artery of native heart without angina pectoris    [facility]    Office Visit    2 years ago Coronary artery disease involving native coronary artery of native heart without angina pectoris    [facility]    Office Visit                        metoprolol tartrate 50 MG Oral Tab 180 tablet 3     Sig: Take 1 tablet (50 mg total) by mouth 2 (two)  times daily.       Hypertension Medications Protocol Passed - 4/5/2024  5:00 AM        Passed - CMP or BMP in past 12 months        Passed - Last BP reading less than 140/90     BP Readings from Last 1 Encounters:   10/30/23 122/74               Passed - In person appointment or virtual visit in the past 12 mos or appointment in next 3 mos     Recent Outpatient Visits              5 months ago Adult general medical exam    [facility], [facility], Devaughn Ruano MD    Office Visit    1 year ago Coronary artery disease involving native coronary artery of native heart without angina pectoris    [facility], [facility], Devaughn Ruano MD    Office Visit    2 years ago Coronary artery disease involving native coronary artery of native heart without angina pectoris    [facility], Devaughn Ruano MD    Office Visit    2 years ago Coronary artery disease involving native coronary artery of native heart without angina pectoris    [facility] Raad Rivera MD    Office Visit    2 years ago Coronary artery disease involving native coronary artery of native heart without angina pectoris    [facility] Devaughn Ruano MD    Office Visit                      Passed - EGFRCR or GFRNAA > 50     GFR Evaluation  EGFRCR: 92 , resulted on 10/30/2023

## 2024-04-05 NOTE — TELEPHONE ENCOUNTER
Refill passed per [facility] protocols.    Requested Prescriptions   Pending Prescriptions Disp Refills    SPIRONOLACTONE 25 MG Oral Tab [Pharmacy Med Name: SPIRONOLACTONE 25 MG TABLET] 90 tablet 3     Sig: TAKE ONE TABLET BY MOUTH DAILY       Hypertension Medications Protocol Passed - 4/5/2024  5:00 AM        Passed - CMP or BMP in past 12 months        Passed - Last BP reading less than 140/90     BP Readings from Last 1 Encounters:   10/30/23 122/74               Passed - In person appointment or virtual visit in the past 12 mos or appointment in next 3 mos     Recent Outpatient Visits              5 months ago Adult general medical exam    [facility], [facility], Devaughn Ruano MD    Office Visit    1 year ago Coronary artery disease involving native coronary artery of native heart without angina pectoris    [facility], [facility], Devaughn Ruano MD    Office Visit    2 years ago Coronary artery disease involving native coronary artery of native heart without angina pectoris    [facility], [facility], Devaughn Ruano MD    Office Visit    2 years ago Coronary artery disease involving native coronary artery of native heart without angina pectoris    [facility], [facility], Raad Rivera MD    Office Visit    2 years ago Coronary artery disease involving native coronary artery of native heart without angina pectoris    [facility], [facility], Devaughn Ruano MD    Office Visit                      Passed - EGFRCR or GFRNAA > 50     GFR Evaluation  EGFRCR: 92 , resulted on 10/30/2023            CLOPIDOGREL 75 MG Oral Tab [Pharmacy Med Name: CLOPIDOGREL 75 MG TABLET] 90 tablet 3     Sig: TAKE ONE TABLET BY MOUTH DAILY       There is no refill protocol information for this order       ATORVASTATIN 80 MG Oral Tab [Pharmacy Med Name:  ATORVASTATIN 80 MG TABLET] 90 tablet 3     Sig: TAKE ONE TABLET BY MOUTH ONCE NIGHTLY       Cholesterol Medication Protocol Passed - 4/5/2024  5:00 AM        Passed - ALT < 80     Lab Results   Component Value Date    ALT 37 10/30/2023             Passed - ALT resulted within past year        Passed - Lipid panel within past 12 months     Lab Results   Component Value Date    CHOLEST 240 (H) 10/30/2023    TRIG 545 (H) 10/30/2023    HDL 51 10/30/2023    LDL 98 10/30/2023    VLDL 93 (H) 10/30/2023    NONHDLC 189 (H) 10/30/2023             Passed - In person appointment or virtual visit in the past 12 mos or appointment in next 3 mos     Recent Outpatient Visits              5 months ago [facility]    [facility]    1 year ago Coronary artery disease involving native coronary artery of native heart without angina pectoris    [facility]    Office Visit    2 years ago Coronary artery disease involving native coronary artery of native heart without angina pectoris    [facility] Devaughn Ruano MD    Office Visit    2 years ago Coronary artery disease involving native coronary artery of native heart without angina pectoris    [facility] Ra[facility]    Office Visit    2 years ago Coronary artery disease involving native coronary artery of native heart without angina pectoris    [facility] Devaughn Ruano MD    Office Visit                        LISINOPRIL 5 MG Oral Tab [Pharmacy Med Name: LISINOPRIL 5 MG TABLET] 90 tablet 3     Sig: TAKE ONE TABLET BY MOUTH DAILY       Hypertension Medications Protocol Passed - 4/5/2024  5:00 AM        Passed - CMP or BMP in past 12 months        Passed - Last BP reading less than  140/90     BP Readings from Last 1 Encounters:   10/30/23 122/74               Passed - In person appointment or virtual visit in the past 12 mos or appointment in next 3 mos     Recent Outpatient Visits              5 months ago Adult general medical exam    [facility], [facility], Devaughn Ruano MD    Office Visit    1 year ago Coronary artery disease involving native coronary artery of native heart without angina pectoris    [facility], [facility], Devaughn Ruano MD    Office Visit    2 years ago Coronary artery disease involving native coronary artery of native heart without angina pectoris    [facility], Devaughn Ruano MD    Office Visit    2 years ago Coronary artery disease involving native coronary artery of native heart without angina pectoris    [facility], Raad Rivera MD    Office Visit    2 years ago Coronary artery disease involving native coronary artery of native heart without angina pectoris    [facility]    [facility]                      Passed - EGFRCR or GFRNAA > 50     GFR Evaluation  EGFRCR: 92 , resulted on 10/30/2023            METOPROLOL TARTRATE 50 MG Oral Tab [Pharmacy Med Name: METOPROLOL TARTRATE 50 MG TAB] 180 tablet 3     Sig: TAKE ONE TABLET BY MOUTH TWICE A DAY       Hypertension Medications Protocol Passed - 4/5/2024  5:00 AM        Passed - CMP or BMP in past 12 months        Passed - Last BP reading less than 140/90     BP Readings from Last 1 Encounters:   10/30/23 122/74               Passed - In person appointment or virtual visit in the past 12 mos or appointment in next 3 mos     Recent Outpatient Visits              5 months ago Adult general medical exam    [facility], [facility], Devaughn Ruano MD    Office  Visit    1 year ago Coronary artery disease involving native coronary artery of native heart without angina pectoris    [facility], [facility], Devaughn Ruano MD    Office Visit    2 years ago Coronary artery disease involving native coronary artery of native heart without angina pectoris    [facility], [facility] Devaughn Ruano MD    Office Visit    2 years ago Coronary artery disease involving native coronary artery of native heart without angina pectoris    [facility], [facility] Raad Thomas MD    Office Visit    2 years ago Coronary artery disease involving native coronary artery of native heart without angina pectoris    [facility], [facility], Devaughn Ruano MD    Office Visit                      Passed - EGFRCR or GFRNAA > 50     GFR Evaluation  EGFRCR: 92 , resulted on 10/30/2023

## 2024-04-06 RX ORDER — LISINOPRIL 5 MG/1
5 TABLET ORAL DAILY
Qty: 90 TABLET | Refills: 3 | Status: SHIPPED | OUTPATIENT
Start: 2024-04-06

## 2024-04-06 RX ORDER — SPIRONOLACTONE 25 MG/1
25 TABLET ORAL DAILY
Qty: 90 TABLET | Refills: 3 | Status: SHIPPED | OUTPATIENT
Start: 2024-04-06

## 2024-04-06 RX ORDER — CLOPIDOGREL BISULFATE 75 MG/1
75 TABLET ORAL DAILY
Qty: 90 TABLET | Refills: 3 | Status: SHIPPED | OUTPATIENT
Start: 2024-04-06

## 2024-05-02 ENCOUNTER — NURSE TRIAGE (OUTPATIENT)
Dept: INTERNAL MEDICINE CLINIC | Facility: CLINIC | Age: 44
End: 2024-05-02

## 2024-05-02 ENCOUNTER — OFFICE VISIT (OUTPATIENT)
Facility: CLINIC | Age: 44
End: 2024-05-02
Payer: MEDICAID

## 2024-05-02 VITALS
BODY MASS INDEX: 29.57 KG/M2 | SYSTOLIC BLOOD PRESSURE: 114 MMHG | OXYGEN SATURATION: 96 % | HEART RATE: 95 BPM | DIASTOLIC BLOOD PRESSURE: 60 MMHG | HEIGHT: 66 IN | RESPIRATION RATE: 18 BRPM | WEIGHT: 184 LBS | TEMPERATURE: 98 F

## 2024-05-02 DIAGNOSIS — E11.9 TYPE 2 DIABETES MELLITUS WITHOUT COMPLICATION, WITHOUT LONG-TERM CURRENT USE OF INSULIN (HCC): ICD-10-CM

## 2024-05-02 DIAGNOSIS — L50.9 URTICARIA: Primary | ICD-10-CM

## 2024-05-02 DIAGNOSIS — Z12.11 SCREEN FOR COLON CANCER: ICD-10-CM

## 2024-05-02 DIAGNOSIS — I25.10 CORONARY ARTERY DISEASE INVOLVING NATIVE CORONARY ARTERY OF NATIVE HEART WITHOUT ANGINA PECTORIS: ICD-10-CM

## 2024-05-02 PROCEDURE — 99214 OFFICE O/P EST MOD 30 MIN: CPT | Performed by: INTERNAL MEDICINE

## 2024-05-02 RX ORDER — PREDNISONE 20 MG/1
TABLET ORAL
Qty: 9 TABLET | Refills: 0 | Status: SHIPPED | OUTPATIENT
Start: 2024-05-02

## 2024-05-02 NOTE — PROGRESS NOTES
Chinedu Joshi is a 44 year old male.  Chief Complaint   Patient presents with    Rash     Patient reports small scattered rashes in both hands, abdomen,left cheek, neck , thighs and legs. It began , has been spreading began in only palms of hands. Reports no pain but mild itching. Denies anyone else in household with rash.      HPI:   44-year-old gentleman here for follow-up and also for evaluation of a rash.  He noticed rash yesterday with itching.  Itching got better however rash got spread.  Denies any new clothing, new detergent, new lotion, new food products etc.  No other family numbers have rash.      Current Outpatient Medications   Medication Sig Dispense Refill    predniSONE 20 MG Oral Tab Take 2 tablets for 3 days and 1 tablet for 3 days 9 tablet 0    spironolactone 25 MG Oral Tab Take 1 tablet (25 mg total) by mouth daily. 90 tablet 3    clopidogrel 75 MG Oral Tab Take 1 tablet (75 mg total) by mouth daily. 90 tablet 3    lisinopril 5 MG Oral Tab Take 1 tablet (5 mg total) by mouth daily. 90 tablet 3    atorvastatin 80 MG Oral Tab Take 1 tablet (80 mg total) by mouth nightly. 90 tablet 3    metoprolol tartrate 50 MG Oral Tab Take 1 tablet (50 mg total) by mouth 2 (two) times daily. 180 tablet 3    aspirin (ASPIRIN LOW DOSE) 81 MG Oral Tab EC Take 1 tablet (81 mg total) by mouth daily. 90 tablet 3      Past Medical History:    Acute chest pain    Essential hypertension    Heart disease    Myocardial infarction (HCC)      Past Surgical History:   Procedure Laterality Date    Other surgical history  2019    stents      Social History:  Social History     Socioeconomic History    Marital status:    Tobacco Use    Smoking status: Former     Current packs/day: 0.00     Types: Cigarettes     Quit date: 2019     Years since quittin.1    Smokeless tobacco: Never   Vaping Use    Vaping status: Never Used   Substance and Sexual Activity    Alcohol use: Yes    Drug use: Never     Sexual activity: Yes     Partners: Female      Family History   Problem Relation Age of Onset    Diabetes Mother       No Known Allergies     REVIEW OF SYSTEMS:   Review of Systems   Review of Systems   Constitutional: Negative for activity change, appetite change and fever.   HENT: Negative for congestion and voice change.    Respiratory: Negative for cough and shortness of breath.    Cardiovascular: Negative for chest pain.   Gastrointestinal: Negative for abdominal distention, abdominal pain and vomiting.   Genitourinary: Negative for hematuria.   Skin: Rash present in the chest, thorax approximately lower extremity  Psychiatric/Behavioral: Negative for behavioral problems.   Wt Readings from Last 5 Encounters:   05/02/24 184 lb (83.5 kg)   10/30/23 184 lb (83.5 kg)   04/01/23 192 lb (87.1 kg)   01/10/22 193 lb (87.5 kg)   10/22/21 200 lb (90.7 kg)     Body mass index is 29.7 kg/m².      EXAM:   /60 (BP Location: Right arm, Patient Position: Sitting, Cuff Size: adult)   Pulse 95   Temp 97.8 °F (36.6 °C) (Temporal)   Resp 18   Ht 5' 6\" (1.676 m)   Wt 184 lb (83.5 kg)   SpO2 96%   BMI 29.70 kg/m²   Physical Exam   Constitutional:       Appearance: Normal appearance.   HENT:      Head: Normocephalic.   Eyes:      Conjunctiva/sclera: Conjunctivae normal.   Cardiovascular:      Rate and Rhythm: Normal rate and regular rhythm.      Heart sounds: Normal heart sounds. No murmur heard.  Pulmonary:      Effort: Pulmonary effort is normal.      Breath sounds: Normal breath sounds. No rhonchi or rales.   Abdominal:      General: Bowel sounds are normal.      Palpations: Abdomen is soft.      Tenderness: There is no abdominal tenderness.   Musculoskeletal:      Cervical back: Neck supple.      Right lower leg: No edema.      Left lower leg: No edema.   Skin:     General: Skin is warm and dry.   Neurological:      General: No focal deficit present.      Mental Status: He is alert and oriented to person, place,  and time. Mental status is at baseline.   Bilateral barefoot skin diabetic exam is normal, visualized feet and the appearance is normal.  Bilateral monofilament/sensation of both feet is normal.  Pulsation pedal pulse exam of both lower legs/feet is normal as well.  Psychiatric:         Mood and Affect: Mood normal.         Behavior: Behavior normal.   Diffuse rash present.    ASSESSMENT AND PLAN:   1. Urticaria  Most likely allergic reaction.  No tongue swelling or mucosal edema.  Will do prednisone for 5 days.  Can take Xyzal.  If there is any shortness of breath, cough, mucosal swelling, go to the emergency room.  If there is any recurrence, see dermatology.  Referral placed.  - Derm Referral - Analilia ([place])    2. Type 2 diabetes mellitus without complication, without long-term current use of insulin ([facility])  A1c is good.  Ophthalmology referral given.  - Ophthalmology Referral - External    3. Coronary artery disease involving native coronary artery of native heart without angina pectoris  Stable with unremarkable cardiac review systems.  Continue antiplatelet and statins.    4. Screen for colon cancer    - Occult Blood, Fecal, FIT Immunoassay  - GASTRO - INTERNAL    Plan: Medication prescribed.  Labs ordered.  Referral given.  I will see him back in 6 months.      The patient indicates understanding of these issues and agrees to the plan.  No follow-ups on file.    This note was prepared using Dragon [employer] voice recognition dictation software. As a result errors may occur. When identified these errors have been corrected. While every attempt is made to correct errors during dictation discrepancies may still exist.

## 2024-05-02 NOTE — TELEPHONE ENCOUNTER
Action Requested: Summary for Provider     []  Critical Lab, Recommendations Needed  [] Need Additional Advice  [x]   FYI    []   Need Orders  [] Need Medications Sent to Pharmacy  []  Other     SUMMARY: Spouse Vickie(on HIPAA) with patient in the back ground indicated that patient has a rash- small red bumps all over his torso and chest. Rash is sometimes itchy. Also noticed that both of his hands are swollen and tight. No pain. No fevers.  Had cold symptoms 2 days ago but they resolved. Has not been gardening. Nothing new. No issues breathing or swallowing. No other symptoms. Patient wanted to be seen today. Appointment made for today at 10:30am with Dr Devaughn Posada at the [facility] building-address provided. Advised spouse that if any difficulty breathing or swallowing to go to the emergency room. Spouse agreed.    Reason for call: Rash (Rash on torso/chest/) and Swelling Edema (Bilateral hands swollen/)  Onset:  May 2, 2024    Reason for Disposition   Patient wants to be seen   Patient wants to be seen    Protocols used: Rash or Redness - Qodhljihw-A-SB, Hand and Wrist Pain-A-OH

## 2024-07-10 RX ORDER — ASPIRIN 81 MG/1
81 TABLET ORAL DAILY
Qty: 90 TABLET | Refills: 3 | Status: SHIPPED | OUTPATIENT
Start: 2024-07-10

## 2024-07-10 NOTE — TELEPHONE ENCOUNTER
Refill passed per protocol.    Requested Prescriptions   Pending Prescriptions Disp Refills    ASPIRIN LOW DOSE 81 MG Oral Tab EC [Pharmacy Med Name: ASPIRIN EC 81 MG TABLET] 90 tablet 3     Sig: TAKE 1 TABLET BY MOUTH DAILY       Aspirin Protocol Passed - 7/7/2024  5:30 AM        Passed - In person appointment or virtual visit in the past 6 mos or appointment in next 3 mos     Recent Outpatient Visits              2 months ago [facility]    8 months ago Adult general medical exam    [facility] Devaughn Ruano MD    Office Visit    1 year ago Coronary artery disease involving native coronary artery of native heart without angina pectoris    [facility]    [facility]    2 years ago Coronary artery disease involving native coronary artery of native heart without angina pectoris    [facility] Devaughn Ruano MD    Office Visit    2 years ago Coronary artery disease involving native coronary artery of native heart without angina pectoris    [facility], [facility] Raad Rivera MD    Office Visit                         Recent Outpatient Visits              2 months ago Urticaria    [facility]    [facility]    8 months ago Adult general medical exam    [facility] Devaughn Ruano MD    Office Visit    1 year ago Coronary artery disease involving native coronary artery of native heart without angina pectoris    [facility]    [facility]    2 years ago Coronary artery disease involving native coronary artery of native heart without angina pectoris    [place]  [facility] [place]    Office Visit    2 years ago Coronary artery disease involving native coronary artery of native heart without angina pectoris    [facility], Raad Rivera MD    Office Visit

## 2024-08-07 ENCOUNTER — MED REC SCAN ONLY (OUTPATIENT)
Dept: INTERNAL MEDICINE CLINIC | Facility: CLINIC | Age: 44
End: 2024-08-07

## 2025-04-22 RX ORDER — ATORVASTATIN CALCIUM 80 MG/1
80 TABLET, FILM COATED ORAL NIGHTLY
Qty: 30 TABLET | Refills: 0 | Status: SHIPPED | OUTPATIENT
Start: 2025-04-22

## 2025-04-22 RX ORDER — METOPROLOL TARTRATE 50 MG
50 TABLET ORAL 2 TIMES DAILY
Qty: 60 TABLET | Refills: 0 | Status: SHIPPED | OUTPATIENT
Start: 2025-04-22

## 2025-04-22 RX ORDER — LISINOPRIL 5 MG/1
5 TABLET ORAL DAILY
Qty: 30 TABLET | Refills: 0 | Status: SHIPPED | OUTPATIENT
Start: 2025-04-22

## 2025-04-22 RX ORDER — SPIRONOLACTONE 25 MG/1
25 TABLET ORAL DAILY
Qty: 30 TABLET | Refills: 0 | Status: SHIPPED | OUTPATIENT
Start: 2025-04-22

## 2025-04-22 RX ORDER — CLOPIDOGREL BISULFATE 75 MG/1
75 TABLET ORAL DAILY
Qty: 30 TABLET | Refills: 0 | Status: SHIPPED | OUTPATIENT
Start: 2025-04-22

## 2025-04-22 NOTE — TELEPHONE ENCOUNTER
Please review; protocol failed/ has no protocol      No active /future labs noted   Message sent for patient to make an appointment.

## 2025-05-23 ENCOUNTER — TELEPHONE (OUTPATIENT)
Dept: INTERNAL MEDICINE CLINIC | Facility: CLINIC | Age: 45
End: 2025-05-23

## 2025-05-27 NOTE — TELEPHONE ENCOUNTER
Please review: medication fails/has no protocol attached.    Dr. Posada - please advise,    Multiple attempts to contact patient to schedule an appointment have been made.      - MyChart, phone call (voicemail left), and no response letter was also sent to the patient.    No future appointments with primary care medicine.

## 2025-06-06 RX ORDER — LISINOPRIL 5 MG/1
5 TABLET ORAL DAILY
Qty: 30 TABLET | Refills: 0 | Status: SHIPPED | OUTPATIENT
Start: 2025-06-06

## 2025-06-06 RX ORDER — CLOPIDOGREL BISULFATE 75 MG/1
75 TABLET ORAL DAILY
Qty: 30 TABLET | Refills: 0 | Status: SHIPPED | OUTPATIENT
Start: 2025-06-06

## 2025-06-06 RX ORDER — METOPROLOL TARTRATE 50 MG
50 TABLET ORAL 2 TIMES DAILY
Qty: 60 TABLET | Refills: 0 | Status: SHIPPED | OUTPATIENT
Start: 2025-06-06

## 2025-06-06 RX ORDER — SPIRONOLACTONE 25 MG/1
25 TABLET ORAL DAILY
Qty: 30 TABLET | Refills: 0 | Status: SHIPPED | OUTPATIENT
Start: 2025-06-06

## 2025-06-06 RX ORDER — ATORVASTATIN CALCIUM 80 MG/1
80 TABLET, FILM COATED ORAL NIGHTLY
Qty: 30 TABLET | Refills: 0 | Status: SHIPPED | OUTPATIENT
Start: 2025-06-06

## 2025-07-23 RX ORDER — ASPIRIN 81 MG/1
81 TABLET ORAL DAILY
Qty: 90 TABLET | Refills: 1 | Status: SHIPPED | OUTPATIENT
Start: 2025-07-23

## 2025-07-23 NOTE — TELEPHONE ENCOUNTER
Refill passed per [facility] protocol.   Requested Prescriptions   Pending Prescriptions Disp Refills    ASPIRIN LOW DOSE 81 MG Oral Tab EC [Pharmacy Med Name: ASPIRIN EC 81 MG TABLET] 90 tablet 3     Sig: TAKE 1 TABLET BY MOUTH DAILY       Aspirin Protocol Passed - 7/23/2025  9:00 AM        Passed - In person appointment or virtual visit in the past 6 mos or appointment in next 3 mos     Recent Outpatient Visits              1 year ago Urticaria    [facility] Devaughn Posada MD    Office Visit    1 year ago Adult general medical exam    [facility] Devaughn Posada MD    Office Visit    2 years ago Coronary artery disease involving native coronary artery of native heart [facility] angina pectoris    [facility]    3 years ago Coronary artery disease involving native coronary artery of native heart without angina pectoris    [facility] Devaughn Posada MD    Office Visit    3 years ago Coronary artery disease involving native coronary artery of native heart without angina pectoris    [facility] Raad Thomas MD    Office Visit          Future Appointments         Provider Department Appt Notes    In 2 months Devaughn Posada MD [facility] lst px 10/30/23 Need my prescriptions renewed, so earlier appointment would be greatly appreciated, thank you. 6/27/25 Jenny requested ins. infor.                    Passed - Medication is active on med list              Recent Outpatient Visits              [facility]    1 year ago Adult general medical exam    [facility]  Devaughn Ruano MD    Office Visit    2 years ago Coronary artery disease involving native coronary artery of native heart without angina pectoris    [facility], Devaughn Ruano MD    Office Visit    3 years ago Coronary artery disease involving native coronary artery of native heart without angina pectoris    [facility], Devaughn Ruano MD    Office Visit    3 years ago Coronary artery disease involving native coronary artery of native heart without angina pectoris    [facility] [place] Raad Thomas MD    Office Visit            Future Appointments         Provider Department Appt Notes    In 2 months Devaughn Posada MD [facility] lst px 10/30/23 Need my prescriptions renewed, so earlier appointment would be greatly appreciated, thank you. 6/27/25 Jenny requested ins. infor.

## 2025-08-15 ENCOUNTER — TELEPHONE (OUTPATIENT)
Dept: INTERNAL MEDICINE CLINIC | Facility: CLINIC | Age: 45
End: 2025-08-15

## (undated) NOTE — LETTER
Hello,      This is the [facility], office of Dr. Devaughn Posada     Thank you for putting your trust in [facility]. Our goal is to deliver the highest quality healthcare and an exceptional patient experience. Upon reviewing of your medical record shows you are due for the following:     Annual Physical   Colonoscopy  FIT Test   Diabetic foot exam  Diabetic A1C check     Please call [phone] to schedule your appointment or schedule online via [employer].     If you changed to a new provider at another facility, please notify the clinic to update your records.     If you had any recent testing at another facility, please have your results faxed to our office at [phone].      Thank you and have a great day!  05/23/25

## (undated) NOTE — LETTER
4/24/2025    Chinedu Joshi  [address]  [facility] [address]         Dear Chinedu,    This letter is to inform you that our office has made several attempts to reach you by phone without success.  We were attempting to contact you by phone regarding prescription request    Please contact our office at the number listed below as soon as you receive this letter to discuss this issue and to make the necessary changes in our system to your contact information.  Thank you for your cooperation.        Sincerely,    Devaughn Posada MD  [address]  [address]  [facility] [address]  Ph: [phone]  Fax: [phone]         Document electronically generated by:  Kisha ESCALANTE RN